# Patient Record
Sex: FEMALE | Race: WHITE | Employment: STUDENT | URBAN - METROPOLITAN AREA
[De-identification: names, ages, dates, MRNs, and addresses within clinical notes are randomized per-mention and may not be internally consistent; named-entity substitution may affect disease eponyms.]

---

## 2020-01-05 ENCOUNTER — APPOINTMENT (OUTPATIENT)
Dept: CT IMAGING | Age: 20
End: 2020-01-05
Attending: EMERGENCY MEDICINE
Payer: COMMERCIAL

## 2020-01-05 ENCOUNTER — HOSPITAL ENCOUNTER (EMERGENCY)
Age: 20
Discharge: HOME | End: 2020-01-06
Attending: EMERGENCY MEDICINE
Payer: COMMERCIAL

## 2020-01-05 DIAGNOSIS — N20.0 KIDNEY STONE: Primary | ICD-10-CM

## 2020-01-05 PROCEDURE — 74011250636 HC RX REV CODE- 250/636: Performed by: EMERGENCY MEDICINE

## 2020-01-05 PROCEDURE — 80053 COMPREHEN METABOLIC PANEL: CPT

## 2020-01-05 PROCEDURE — 96361 HYDRATE IV INFUSION ADD-ON: CPT | Performed by: EMERGENCY MEDICINE

## 2020-01-05 PROCEDURE — 74176 CT ABD & PELVIS W/O CONTRAST: CPT

## 2020-01-05 PROCEDURE — 83690 ASSAY OF LIPASE: CPT

## 2020-01-05 PROCEDURE — 99284 EMERGENCY DEPT VISIT MOD MDM: CPT | Performed by: EMERGENCY MEDICINE

## 2020-01-05 PROCEDURE — 96374 THER/PROPH/DIAG INJ IV PUSH: CPT | Performed by: EMERGENCY MEDICINE

## 2020-01-05 PROCEDURE — 85025 COMPLETE CBC W/AUTO DIFF WBC: CPT

## 2020-01-05 PROCEDURE — 96375 TX/PRO/DX INJ NEW DRUG ADDON: CPT | Performed by: EMERGENCY MEDICINE

## 2020-01-05 RX ORDER — KETOROLAC TROMETHAMINE 30 MG/ML
30 INJECTION, SOLUTION INTRAMUSCULAR; INTRAVENOUS
Status: COMPLETED | OUTPATIENT
Start: 2020-01-05 | End: 2020-01-05

## 2020-01-05 RX ORDER — MORPHINE SULFATE 2 MG/ML
2 INJECTION, SOLUTION INTRAMUSCULAR; INTRAVENOUS
Status: COMPLETED | OUTPATIENT
Start: 2020-01-05 | End: 2020-01-05

## 2020-01-05 RX ADMIN — SODIUM CHLORIDE 1000 ML: 900 INJECTION, SOLUTION INTRAVENOUS at 23:40

## 2020-01-05 RX ADMIN — MORPHINE SULFATE 2 MG: 2 INJECTION, SOLUTION INTRAMUSCULAR; INTRAVENOUS at 23:40

## 2020-01-05 RX ADMIN — KETOROLAC TROMETHAMINE 30 MG: 30 INJECTION, SOLUTION INTRAMUSCULAR at 23:40

## 2020-01-06 VITALS
TEMPERATURE: 98 F | DIASTOLIC BLOOD PRESSURE: 60 MMHG | SYSTOLIC BLOOD PRESSURE: 127 MMHG | OXYGEN SATURATION: 97 % | RESPIRATION RATE: 18 BRPM | HEART RATE: 108 BPM

## 2020-01-06 LAB
ALBUMIN SERPL-MCNC: 3.8 G/DL (ref 3.5–5)
ALBUMIN/GLOB SERPL: 1 {RATIO} (ref 1.2–3.5)
ALP SERPL-CCNC: 85 U/L (ref 50–136)
ALT SERPL-CCNC: 15 U/L (ref 12–65)
ANION GAP SERPL CALC-SCNC: 6 MMOL/L (ref 7–16)
APPEARANCE UR: CLEAR
AST SERPL-CCNC: 18 U/L (ref 15–37)
BACTERIA URNS QL MICRO: 0 /HPF
BASOPHILS # BLD: 0.1 K/UL (ref 0–0.2)
BASOPHILS NFR BLD: 1 % (ref 0–2)
BILIRUB SERPL-MCNC: 1.1 MG/DL (ref 0.2–1.1)
BILIRUB UR QL: NEGATIVE
BUN SERPL-MCNC: 11 MG/DL (ref 6–23)
CALCIUM SERPL-MCNC: 9 MG/DL (ref 8.3–10.4)
CASTS URNS QL MICRO: ABNORMAL /LPF
CHLORIDE SERPL-SCNC: 106 MMOL/L (ref 98–107)
CO2 SERPL-SCNC: 25 MMOL/L (ref 21–32)
COLOR UR: YELLOW
CREAT SERPL-MCNC: 0.88 MG/DL (ref 0.6–1)
DIFFERENTIAL METHOD BLD: ABNORMAL
EOSINOPHIL # BLD: 0.1 K/UL (ref 0–0.8)
EOSINOPHIL NFR BLD: 1 % (ref 0.5–7.8)
EPI CELLS #/AREA URNS HPF: ABNORMAL /HPF
ERYTHROCYTE [DISTWIDTH] IN BLOOD BY AUTOMATED COUNT: 11.8 % (ref 11.9–14.6)
GLOBULIN SER CALC-MCNC: 3.9 G/DL (ref 2.3–3.5)
GLUCOSE SERPL-MCNC: 97 MG/DL (ref 65–100)
GLUCOSE UR STRIP.AUTO-MCNC: NEGATIVE MG/DL
HCT VFR BLD AUTO: 40.1 % (ref 35.8–46.3)
HGB BLD-MCNC: 13.9 G/DL (ref 11.7–15.4)
HGB UR QL STRIP: ABNORMAL
IMM GRANULOCYTES # BLD AUTO: 0.1 K/UL (ref 0–0.5)
IMM GRANULOCYTES NFR BLD AUTO: 1 % (ref 0–5)
KETONES UR QL STRIP.AUTO: NEGATIVE MG/DL
LEUKOCYTE ESTERASE UR QL STRIP.AUTO: NEGATIVE
LIPASE SERPL-CCNC: 744 U/L (ref 73–393)
LYMPHOCYTES # BLD: 2.6 K/UL (ref 0.5–4.6)
LYMPHOCYTES NFR BLD: 32 % (ref 13–44)
MCH RBC QN AUTO: 31.7 PG (ref 26.1–32.9)
MCHC RBC AUTO-ENTMCNC: 34.7 G/DL (ref 31.4–35)
MCV RBC AUTO: 91.6 FL (ref 79.6–97.8)
MONOCYTES # BLD: 0.5 K/UL (ref 0.1–1.3)
MONOCYTES NFR BLD: 7 % (ref 4–12)
NEUTS SEG # BLD: 4.9 K/UL (ref 1.7–8.2)
NEUTS SEG NFR BLD: 59 % (ref 43–78)
NITRITE UR QL STRIP.AUTO: NEGATIVE
NRBC # BLD: 0 K/UL (ref 0–0.2)
OTHER OBSERVATIONS,UCOM: ABNORMAL
PH UR STRIP: 5.5 [PH] (ref 5–9)
PLATELET # BLD AUTO: 284 K/UL (ref 150–450)
PMV BLD AUTO: 10.5 FL (ref 9.4–12.3)
POTASSIUM SERPL-SCNC: 4 MMOL/L (ref 3.5–5.1)
PROT SERPL-MCNC: 7.7 G/DL (ref 6.3–8.2)
PROT UR STRIP-MCNC: 30 MG/DL
RBC # BLD AUTO: 4.38 M/UL (ref 4.05–5.2)
RBC #/AREA URNS HPF: ABNORMAL /HPF
SODIUM SERPL-SCNC: 137 MMOL/L (ref 136–145)
SP GR UR REFRACTOMETRY: >1.03 (ref 1–1.02)
UROBILINOGEN UR QL STRIP.AUTO: 0.2 EU/DL (ref 0.2–1)
WBC # BLD AUTO: 8.3 K/UL (ref 4.3–11.1)
WBC URNS QL MICRO: ABNORMAL /HPF

## 2020-01-06 PROCEDURE — 74011250636 HC RX REV CODE- 250/636: Performed by: EMERGENCY MEDICINE

## 2020-01-06 PROCEDURE — 96375 TX/PRO/DX INJ NEW DRUG ADDON: CPT | Performed by: EMERGENCY MEDICINE

## 2020-01-06 PROCEDURE — 81003 URINALYSIS AUTO W/O SCOPE: CPT

## 2020-01-06 PROCEDURE — 74011250637 HC RX REV CODE- 250/637: Performed by: EMERGENCY MEDICINE

## 2020-01-06 RX ORDER — TAMSULOSIN HYDROCHLORIDE 0.4 MG/1
0.4 CAPSULE ORAL DAILY
Qty: 10 CAP | Refills: 0 | Status: SHIPPED | OUTPATIENT
Start: 2020-01-06 | End: 2020-01-16

## 2020-01-06 RX ORDER — TAMSULOSIN HYDROCHLORIDE 0.4 MG/1
0.4 CAPSULE ORAL
Status: COMPLETED | OUTPATIENT
Start: 2020-01-06 | End: 2020-01-06

## 2020-01-06 RX ORDER — HYDROCODONE BITARTRATE AND ACETAMINOPHEN 7.5; 325 MG/1; MG/1
1 TABLET ORAL 2 TIMES DAILY
Qty: 17 TAB | Refills: 0 | Status: SHIPPED | OUTPATIENT
Start: 2020-01-06 | End: 2020-01-13

## 2020-01-06 RX ORDER — ONDANSETRON 2 MG/ML
4 INJECTION INTRAMUSCULAR; INTRAVENOUS
Status: COMPLETED | OUTPATIENT
Start: 2020-01-06 | End: 2020-01-06

## 2020-01-06 RX ORDER — ONDANSETRON 8 MG/1
8 TABLET, ORALLY DISINTEGRATING ORAL
Qty: 12 TAB | Refills: 0 | Status: SHIPPED | OUTPATIENT
Start: 2020-01-06 | End: 2020-01-29 | Stop reason: CLARIF

## 2020-01-06 RX ORDER — HYDROCODONE BITARTRATE AND ACETAMINOPHEN 5; 325 MG/1; MG/1
1 TABLET ORAL ONCE
Status: COMPLETED | OUTPATIENT
Start: 2020-01-06 | End: 2020-01-06

## 2020-01-06 RX ADMIN — ONDANSETRON 4 MG: 2 INJECTION INTRAMUSCULAR; INTRAVENOUS at 00:27

## 2020-01-06 RX ADMIN — TAMSULOSIN HYDROCHLORIDE 0.4 MG: 0.4 CAPSULE ORAL at 00:38

## 2020-01-06 RX ADMIN — HYDROCODONE BITARTRATE AND ACETAMINOPHEN 1 TABLET: 5; 325 TABLET ORAL at 00:38

## 2020-01-06 NOTE — ED PROVIDER NOTES
Patient presents to the ER complaint of left flank pain. Patient states symptoms started early this evening with pain in her left lower quadrant. States pain is since radiated towards her left flank. Reports some nausea vomiting. Denies any urinary symptoms. Reports normal bowel movements the past couple days. Denies any dysuria, hematuria, hematochezia or melena. Denies any vaginal bleeding or vaginal discharge    The history is provided by the patient. Flank Pain    This is a new problem. The current episode started 1 to 2 hours ago. The problem occurs constantly. The pain is present in the left side. The quality of the pain is described as aching. The pain is at a severity of 5/10. The pain is moderate. Pertinent negatives include no fever, no numbness, no abdominal pain, no abdominal swelling, no bladder incontinence, no dysuria, no paresthesias and no paresis. She has tried nothing for the symptoms. History reviewed. No pertinent past medical history. Past Surgical History:   Procedure Laterality Date    HX CHOLECYSTECTOMY           History reviewed. No pertinent family history.     Social History     Socioeconomic History    Marital status: SINGLE     Spouse name: Not on file    Number of children: Not on file    Years of education: Not on file    Highest education level: Not on file   Occupational History    Not on file   Social Needs    Financial resource strain: Not on file    Food insecurity:     Worry: Not on file     Inability: Not on file    Transportation needs:     Medical: Not on file     Non-medical: Not on file   Tobacco Use    Smoking status: Never Smoker    Smokeless tobacco: Never Used   Substance and Sexual Activity    Alcohol use: Never     Frequency: Never    Drug use: Never    Sexual activity: Not on file   Lifestyle    Physical activity:     Days per week: Not on file     Minutes per session: Not on file    Stress: Not on file   Relationships    Social connections:     Talks on phone: Not on file     Gets together: Not on file     Attends Scientologist service: Not on file     Active member of club or organization: Not on file     Attends meetings of clubs or organizations: Not on file     Relationship status: Not on file    Intimate partner violence:     Fear of current or ex partner: Not on file     Emotionally abused: Not on file     Physically abused: Not on file     Forced sexual activity: Not on file   Other Topics Concern    Not on file   Social History Narrative    Not on file         ALLERGIES: Patient has no known allergies. Review of Systems   Constitutional: Negative for fatigue, fever and unexpected weight change. HENT: Negative for congestion and dental problem. Eyes: Negative for photophobia, redness and visual disturbance. Respiratory: Negative for chest tightness. Cardiovascular: Negative for palpitations and leg swelling. Gastrointestinal: Positive for nausea and vomiting. Negative for abdominal pain. Endocrine: Negative for polydipsia, polyphagia and polyuria. Genitourinary: Positive for flank pain. Negative for bladder incontinence, dysuria and urgency. Musculoskeletal: Negative for back pain. Skin: Negative for color change and pallor. Neurological: Negative for light-headedness, numbness and paresthesias. Hematological: Negative for adenopathy. Does not bruise/bleed easily. All other systems reviewed and are negative. Vitals:    01/05/20 2316   BP: 122/69   Pulse: 88   Resp: 18   Temp: 98 °F (36.7 °C)   SpO2: 98%            Physical Exam  Vitals signs and nursing note reviewed. Constitutional:       Appearance: Normal appearance. HENT:      Head: Normocephalic and atraumatic. Neck:      Musculoskeletal: Normal range of motion and neck supple. No neck rigidity. Cardiovascular:      Rate and Rhythm: Normal rate and regular rhythm. Pulses: Normal pulses. Heart sounds: Normal heart sounds. Pulmonary:      Effort: Pulmonary effort is normal.      Breath sounds: Normal breath sounds. Abdominal:      General: Abdomen is flat. Bowel sounds are normal.      Palpations: Abdomen is soft. Musculoskeletal: Normal range of motion. General: No swelling. Skin:     General: Skin is warm. Capillary Refill: Capillary refill takes less than 2 seconds. Coloration: Skin is not jaundiced. Neurological:      General: No focal deficit present. Mental Status: She is alert. Mental status is at baseline. MDM  Number of Diagnoses or Management Options  Diagnosis management comments: Differential diagnosis includes kidney stone, pyelonephritis, gastritis, colitis, ovarian cyst    1:13 AM  Normal basic labs and normal chemistry function. Urinalysis shows no signs of infection  CT urogram shows a proximal 4 mm left ureteral stone    Symptomatically medically patient is improved, has been treated here with pain medicine. Given first dose of Flomax. Plan to discharge home and follow-up with urology.        Amount and/or Complexity of Data Reviewed  Clinical lab tests: ordered and reviewed  Tests in the radiology section of CPT®: ordered and reviewed    Risk of Complications, Morbidity, and/or Mortality  Presenting problems: moderate  Diagnostic procedures: moderate  Management options: moderate    Patient Progress  Patient progress: improved         Procedures               Results Include:    Recent Results (from the past 24 hour(s))   CBC WITH AUTOMATED DIFF    Collection Time: 01/05/20 11:37 PM   Result Value Ref Range    WBC 8.3 4.3 - 11.1 K/uL    RBC 4.38 4.05 - 5.2 M/uL    HGB 13.9 11.7 - 15.4 g/dL    HCT 40.1 35.8 - 46.3 %    MCV 91.6 79.6 - 97.8 FL    MCH 31.7 26.1 - 32.9 PG    MCHC 34.7 31.4 - 35.0 g/dL    RDW 11.8 (L) 11.9 - 14.6 %    PLATELET 703 504 - 643 K/uL    MPV 10.5 9.4 - 12.3 FL    ABSOLUTE NRBC 0.00 0.0 - 0.2 K/uL    DF AUTOMATED      NEUTROPHILS 59 43 - 78 % LYMPHOCYTES 32 13 - 44 %    MONOCYTES 7 4.0 - 12.0 %    EOSINOPHILS 1 0.5 - 7.8 %    BASOPHILS 1 0.0 - 2.0 %    IMMATURE GRANULOCYTES 1 0.0 - 5.0 %    ABS. NEUTROPHILS 4.9 1.7 - 8.2 K/UL    ABS. LYMPHOCYTES 2.6 0.5 - 4.6 K/UL    ABS. MONOCYTES 0.5 0.1 - 1.3 K/UL    ABS. EOSINOPHILS 0.1 0.0 - 0.8 K/UL    ABS. BASOPHILS 0.1 0.0 - 0.2 K/UL    ABS. IMM. GRANS. 0.1 0.0 - 0.5 K/UL   METABOLIC PANEL, COMPREHENSIVE    Collection Time: 01/05/20 11:37 PM   Result Value Ref Range    Sodium 137 136 - 145 mmol/L    Potassium 4.0 3.5 - 5.1 mmol/L    Chloride 106 98 - 107 mmol/L    CO2 25 21 - 32 mmol/L    Anion gap 6 (L) 7 - 16 mmol/L    Glucose 97 65 - 100 mg/dL    BUN 11 6 - 23 MG/DL    Creatinine 0.88 0.6 - 1.0 MG/DL    GFR est AA >60 >60 ml/min/1.73m2    GFR est non-AA >60 >60 ml/min/1.73m2    Calcium 9.0 8.3 - 10.4 MG/DL    Bilirubin, total 1.1 0.2 - 1.1 MG/DL    ALT (SGPT) 15 12 - 65 U/L    AST (SGOT) 18 15 - 37 U/L    Alk. phosphatase 85 50 - 136 U/L    Protein, total 7.7 6.3 - 8.2 g/dL    Albumin 3.8 3.5 - 5.0 g/dL    Globulin 3.9 (H) 2.3 - 3.5 g/dL    A-G Ratio 1.0 (L) 1.2 - 3.5     LIPASE    Collection Time: 01/05/20 11:37 PM   Result Value Ref Range    Lipase 744 (H) 73 - 393 U/L   URINALYSIS W/ RFLX MICROSCOPIC    Collection Time: 01/06/20 12:29 AM   Result Value Ref Range    Color YELLOW      Appearance CLEAR      Specific gravity >1.030 (H) 1.001 - 1.023    pH (UA) 5.5 5.0 - 9.0      Protein 30 (A) NEG mg/dL    Glucose NEGATIVE  NEG mg/dL    Ketone NEGATIVE  NEG mg/dL    Bilirubin NEGATIVE  NEG      Blood LARGE (A) NEG      Urobilinogen 0.2 0.2 - 1.0 EU/dL    Nitrites NEGATIVE  NEG      Leukocyte Esterase NEGATIVE  NEG      WBC 5-10 0 /hpf    RBC 3-5 0 /hpf    Epithelial cells 0-3 0 /hpf    Bacteria 0 0 /hpf    Casts 0-3 0 /lpf    Other observations RESULTS VERIFIED MANUALLY       Voice dictation software was used during the making of this note.   This software is not perfect and grammatical and other typographical errors may be present. This note has been proofread, but may still contain errors.   Lavern Randolph MD; 1/6/2020 @1:14 AM   ===================================================================

## 2020-01-06 NOTE — DISCHARGE INSTRUCTIONS
Take medications as prescribed  Drink plenty of fluids  Follow-up with urology  Return to the ER for any new or worsening symptoms    Kidney Stone: Care Instructions  Your Care Instructions    Kidney stones are formed when salts, minerals, and other substances normally found in the urine clump together. They can be as small as grains of sand or, rarely, as large as golf balls. While the stone is traveling through the ureter, which is the tube that carries urine from the kidney to the bladder, you will probably feel pain. The pain may be mild or very severe. You may also have some blood in your urine. As soon as the stone reaches the bladder, any intense pain should go away. If a stone is too large to pass on its own, you may need a medical procedure to help you pass the stone. The doctor has checked you carefully, but problems can develop later. If you notice any problems or new symptoms, get medical treatment right away. Follow-up care is a key part of your treatment and safety. Be sure to make and go to all appointments, and call your doctor if you are having problems. It's also a good idea to know your test results and keep a list of the medicines you take. How can you care for yourself at home? · Drink plenty of fluids, enough so that your urine is light yellow or clear like water. If you have kidney, heart, or liver disease and have to limit fluids, talk with your doctor before you increase the amount of fluids you drink. · Take pain medicines exactly as directed. Call your doctor if you think you are having a problem with your medicine. ? If the doctor gave you a prescription medicine for pain, take it as prescribed. ? If you are not taking a prescription pain medicine, ask your doctor if you can take an over-the-counter medicine. Read and follow all instructions on the label. · Your doctor may ask you to strain your urine so that you can collect your kidney stone when it passes.  You can use a kitchen strainer or a tea strainer to catch the stone. Store it in a plastic bag until you see your doctor again. Preventing future kidney stones  Some changes in your diet may help prevent kidney stones. Depending on the cause of your stones, your doctor may recommend that you:  · Drink plenty of fluids, enough so that your urine is light yellow or clear like water. If you have kidney, heart, or liver disease and have to limit fluids, talk with your doctor before you increase the amount of fluids you drink. · Limit coffee, tea, and alcohol. Also avoid grapefruit juice. · Do not take more than the recommended daily dose of vitamins C and D.  · Avoid antacids such as Gaviscon, Maalox, Mylanta, or Tums. · Limit the amount of salt (sodium) in your diet. · Eat a balanced diet that is not too high in protein. · Limit foods that are high in a substance called oxalate, which can cause kidney stones. These foods include dark green vegetables, rhubarb, chocolate, wheat bran, nuts, cranberries, and beans. When should you call for help? Call your doctor now or seek immediate medical care if:    · You cannot keep down fluids.     · Your pain gets worse.     · You have a fever or chills.     · You have new or worse pain in your back just below your rib cage (the flank area).     · You have new or more blood in your urine.    Watch closely for changes in your health, and be sure to contact your doctor if:    · You do not get better as expected. Where can you learn more? Go to http://martha-jose j.info/. Enter I910 in the search box to learn more about \"Kidney Stone: Care Instructions. \"  Current as of: October 31, 2018  Content Version: 12.2  © 0879-6869 [employer]. Care instructions adapted under license by [employer] (which disclaims liability or warranty for this information).  If you have questions about a medical condition or this instruction, always ask your healthcare professional. [address] any warranty or liability for your use of this information. Patient Education        Learning About Diet for Kidney Stone Prevention  What are kidney stones? Kidney stones are made of salts and minerals in the urine that form small \"michael. \" Stones can form in the kidneys and the ureters (the tubes that lead from the kidneys to the bladder). They can also form in the bladder. Stones may not cause a problem as long as they stay in the kidneys. But they can cause sudden, severe pain. Pain is most likely when the stones travel from the kidneys to the bladder. Kidney stones can cause bloody urine. Kidney stones often run in families. You are more likely to get them if you don't drink enough fluids, mainly water. Certain foods and drinks and some dietary supplements may also increase your risk for kidney stones if you consume too much of them. What can you do to prevent kidney stones? Changing what you eat may not prevent all types of kidney stones. But for people who have a history of certain kinds of kidney stones, some changes in diet may help. A dietitian can help you set up a meal plan that includes healthy, low-oxalate choices. Here are some general guidelines to get you started. Plan your meals and snacks around foods that are low in oxalate. These foods include:  · Corn, kale, parsnips, and squash,. · Beef, chicken, pork, turkey, and fish. · Milk, butter, cheese, and yogurt. You can eat certain foods that are medium-high in oxalate, but eat them only once in a while. These foods include:  · Bread. · Brown rice. · English muffins. · Figs. · Popcorn. · String beans. · Tomatoes. Limit very high-oxalate foods, including:  · Black tea. · Coffee. · Chocolate. · Dark green vegetables. · Nuts. Here are some other things you can do to help prevent kidney stones. · Drink plenty of fluids.  If you have kidney, heart, or liver disease and have to limit fluids, talk with your doctor before you increase the amount of fluids you drink. · Do not take more than the recommended daily dose of vitamins C and D.  · Limit the salt in your diet. · Eat a balanced diet that is not too high in protein. Follow-up care is a key part of your treatment and safety. Be sure to make and go to all appointments, and call your doctor if you are having problems. It's also a good idea to know your test results and keep a list of the medicines you take. Where can you learn more? Go to http://martha-jose j.info/. Enter C138 in the search box to learn more about \"Learning About Diet for Kidney Stone Prevention. \"  Current as of: November 7, 2018  Content Version: 12.2  © 7106-6823 [employer]. Care instructions adapted under license by [employer] (which disclaims liability or warranty for this information). If you have questions about a medical condition or this instruction, always ask your healthcare professional. [address] any warranty or liability for your use of this information.

## 2020-01-07 ENCOUNTER — HOSPITAL ENCOUNTER (INPATIENT)
Age: 20
LOS: 6 days | Discharge: HOME | DRG: 853 | End: 2020-01-13
Attending: UROLOGY | Admitting: UROLOGY
Payer: COMMERCIAL

## 2020-01-07 ENCOUNTER — ANESTHESIA (OUTPATIENT)
Dept: SURGERY | Age: 20
DRG: 853 | End: 2020-01-07
Payer: COMMERCIAL

## 2020-01-07 ENCOUNTER — ANESTHESIA EVENT (OUTPATIENT)
Dept: SURGERY | Age: 20
DRG: 853 | End: 2020-01-07
Payer: COMMERCIAL

## 2020-01-07 DIAGNOSIS — R09.02 HYPOXIA: ICD-10-CM

## 2020-01-07 DIAGNOSIS — A41.9 SEPSIS DUE TO URINARY TRACT INFECTION (HCC): ICD-10-CM

## 2020-01-07 DIAGNOSIS — N39.0 SEPSIS DUE TO URINARY TRACT INFECTION (HCC): ICD-10-CM

## 2020-01-07 DIAGNOSIS — N20.1 LEFT URETERAL STONE: ICD-10-CM

## 2020-01-07 DIAGNOSIS — J90 BILATERAL PLEURAL EFFUSION: ICD-10-CM

## 2020-01-07 LAB
ANION GAP SERPL CALC-SCNC: 11 MMOL/L (ref 7–16)
BUN SERPL-MCNC: 24 MG/DL (ref 6–23)
CALCIUM SERPL-MCNC: 8.8 MG/DL (ref 8.3–10.4)
CHLORIDE SERPL-SCNC: 104 MMOL/L (ref 98–107)
CO2 SERPL-SCNC: 22 MMOL/L (ref 21–32)
CREAT SERPL-MCNC: 2.28 MG/DL (ref 0.6–1)
ERYTHROCYTE [DISTWIDTH] IN BLOOD BY AUTOMATED COUNT: 12.4 % (ref 11.9–14.6)
GLUCOSE SERPL-MCNC: 91 MG/DL (ref 65–100)
HCG UR QL: NEGATIVE
HCT VFR BLD AUTO: 40.3 % (ref 35.8–46.3)
HGB BLD-MCNC: 13.4 G/DL (ref 11.7–15.4)
MCH RBC QN AUTO: 31.5 PG (ref 26.1–32.9)
MCHC RBC AUTO-ENTMCNC: 33.3 G/DL (ref 31.4–35)
MCV RBC AUTO: 94.8 FL (ref 79.6–97.8)
NRBC # BLD: 0 K/UL (ref 0–0.2)
PLATELET # BLD AUTO: 170 K/UL (ref 150–450)
PMV BLD AUTO: 11.8 FL (ref 9.4–12.3)
POTASSIUM SERPL-SCNC: 4.1 MMOL/L (ref 3.5–5.1)
RBC # BLD AUTO: 4.25 M/UL (ref 4.05–5.2)
SODIUM SERPL-SCNC: 137 MMOL/L (ref 136–145)
WBC # BLD AUTO: 23.2 K/UL (ref 4.3–11.1)

## 2020-01-07 PROCEDURE — 74011250636 HC RX REV CODE- 250/636: Performed by: NURSE ANESTHETIST, CERTIFIED REGISTERED

## 2020-01-07 PROCEDURE — 77030039425 HC BLD LARYNG TRULITE DISP TELE -A: Performed by: ANESTHESIOLOGY

## 2020-01-07 PROCEDURE — 74011250636 HC RX REV CODE- 250/636: Performed by: UROLOGY

## 2020-01-07 PROCEDURE — 0T778DZ DILATION OF LEFT URETER WITH INTRALUMINAL DEVICE, VIA NATURAL OR ARTIFICIAL OPENING ENDOSCOPIC: ICD-10-PCS | Performed by: UROLOGY

## 2020-01-07 PROCEDURE — 74011250636 HC RX REV CODE- 250/636: Performed by: NURSE PRACTITIONER

## 2020-01-07 PROCEDURE — 99218 HC RM OBSERVATION: CPT

## 2020-01-07 PROCEDURE — 36415 COLL VENOUS BLD VENIPUNCTURE: CPT

## 2020-01-07 PROCEDURE — C1769 GUIDE WIRE: HCPCS | Performed by: UROLOGY

## 2020-01-07 PROCEDURE — 77030034696 HC CATH URETH FOL 2W BARD -A: Performed by: UROLOGY

## 2020-01-07 PROCEDURE — 74011250637 HC RX REV CODE- 250/637: Performed by: UROLOGY

## 2020-01-07 PROCEDURE — 77030027138 HC INCENT SPIROMETER -A

## 2020-01-07 PROCEDURE — 87086 URINE CULTURE/COLONY COUNT: CPT

## 2020-01-07 PROCEDURE — 85027 COMPLETE CBC AUTOMATED: CPT

## 2020-01-07 PROCEDURE — C2617 STENT, NON-COR, TEM W/O DEL: HCPCS | Performed by: UROLOGY

## 2020-01-07 PROCEDURE — 74011000258 HC RX REV CODE- 258: Performed by: NURSE PRACTITIONER

## 2020-01-07 PROCEDURE — 76210000006 HC OR PH I REC 0.5 TO 1 HR: Performed by: UROLOGY

## 2020-01-07 PROCEDURE — 74011000250 HC RX REV CODE- 250: Performed by: NURSE ANESTHETIST, CERTIFIED REGISTERED

## 2020-01-07 PROCEDURE — 87040 BLOOD CULTURE FOR BACTERIA: CPT

## 2020-01-07 PROCEDURE — 81025 URINE PREGNANCY TEST: CPT

## 2020-01-07 PROCEDURE — 77030019927 HC TBNG IRR CYSTO BAXT -A: Performed by: UROLOGY

## 2020-01-07 PROCEDURE — 77030037088 HC TUBE ENDOTRACH ORAL NSL COVD-A: Performed by: ANESTHESIOLOGY

## 2020-01-07 PROCEDURE — 74011250636 HC RX REV CODE- 250/636: Performed by: ANESTHESIOLOGY

## 2020-01-07 PROCEDURE — 76060000032 HC ANESTHESIA 0.5 TO 1 HR: Performed by: UROLOGY

## 2020-01-07 PROCEDURE — 80048 BASIC METABOLIC PNL TOTAL CA: CPT

## 2020-01-07 PROCEDURE — 76010000160 HC OR TIME 0.5 TO 1 HR INTENSV-TIER 1: Performed by: UROLOGY

## 2020-01-07 DEVICE — URETERAL STENT
Type: IMPLANTABLE DEVICE | Site: URETER | Status: FUNCTIONAL
Brand: PERCUFLEX™ PLUS

## 2020-01-07 RX ORDER — NALOXONE HYDROCHLORIDE 0.4 MG/ML
0.1 INJECTION, SOLUTION INTRAMUSCULAR; INTRAVENOUS; SUBCUTANEOUS
Status: DISCONTINUED | OUTPATIENT
Start: 2020-01-07 | End: 2020-01-07 | Stop reason: HOSPADM

## 2020-01-07 RX ORDER — HYDROMORPHONE HYDROCHLORIDE 2 MG/ML
0.5 INJECTION, SOLUTION INTRAMUSCULAR; INTRAVENOUS; SUBCUTANEOUS
Status: DISCONTINUED | OUTPATIENT
Start: 2020-01-07 | End: 2020-01-07 | Stop reason: HOSPADM

## 2020-01-07 RX ORDER — DIPHENHYDRAMINE HYDROCHLORIDE 50 MG/ML
12.5 INJECTION, SOLUTION INTRAMUSCULAR; INTRAVENOUS
Status: DISCONTINUED | OUTPATIENT
Start: 2020-01-07 | End: 2020-01-07 | Stop reason: HOSPADM

## 2020-01-07 RX ORDER — SODIUM CHLORIDE 0.9 % (FLUSH) 0.9 %
5-40 SYRINGE (ML) INJECTION AS NEEDED
Status: DISCONTINUED | OUTPATIENT
Start: 2020-01-07 | End: 2020-01-13 | Stop reason: HOSPADM

## 2020-01-07 RX ORDER — SODIUM CHLORIDE, SODIUM LACTATE, POTASSIUM CHLORIDE, CALCIUM CHLORIDE 600; 310; 30; 20 MG/100ML; MG/100ML; MG/100ML; MG/100ML
100 INJECTION, SOLUTION INTRAVENOUS CONTINUOUS
Status: DISCONTINUED | OUTPATIENT
Start: 2020-01-07 | End: 2020-01-07 | Stop reason: HOSPADM

## 2020-01-07 RX ORDER — SODIUM CHLORIDE 0.9 % (FLUSH) 0.9 %
5-40 SYRINGE (ML) INJECTION EVERY 8 HOURS
Status: DISCONTINUED | OUTPATIENT
Start: 2020-01-07 | End: 2020-01-07

## 2020-01-07 RX ORDER — LIDOCAINE HYDROCHLORIDE 10 MG/ML
0.1 INJECTION INFILTRATION; PERINEURAL AS NEEDED
Status: DISCONTINUED | OUTPATIENT
Start: 2020-01-07 | End: 2020-01-07 | Stop reason: HOSPADM

## 2020-01-07 RX ORDER — AMOXICILLIN 250 MG
1 CAPSULE ORAL
Status: DISCONTINUED | OUTPATIENT
Start: 2020-01-07 | End: 2020-01-13 | Stop reason: HOSPADM

## 2020-01-07 RX ORDER — CEFTRIAXONE 1 G/1
1 INJECTION, POWDER, FOR SOLUTION INTRAMUSCULAR; INTRAVENOUS EVERY 24 HOURS
Status: DISCONTINUED | OUTPATIENT
Start: 2020-01-07 | End: 2020-01-07 | Stop reason: SDUPTHER

## 2020-01-07 RX ORDER — PROPOFOL 10 MG/ML
INJECTION, EMULSION INTRAVENOUS AS NEEDED
Status: DISCONTINUED | OUTPATIENT
Start: 2020-01-07 | End: 2020-01-07 | Stop reason: HOSPADM

## 2020-01-07 RX ORDER — SODIUM CHLORIDE, SODIUM LACTATE, POTASSIUM CHLORIDE, CALCIUM CHLORIDE 600; 310; 30; 20 MG/100ML; MG/100ML; MG/100ML; MG/100ML
75 INJECTION, SOLUTION INTRAVENOUS CONTINUOUS
Status: DISCONTINUED | OUTPATIENT
Start: 2020-01-07 | End: 2020-01-07 | Stop reason: HOSPADM

## 2020-01-07 RX ORDER — HYDROCODONE BITARTRATE AND ACETAMINOPHEN 5; 325 MG/1; MG/1
1 TABLET ORAL
Status: DISCONTINUED | OUTPATIENT
Start: 2020-01-07 | End: 2020-01-07

## 2020-01-07 RX ORDER — SODIUM CHLORIDE 9 MG/ML
75 INJECTION, SOLUTION INTRAVENOUS CONTINUOUS
Status: DISCONTINUED | OUTPATIENT
Start: 2020-01-07 | End: 2020-01-09

## 2020-01-07 RX ORDER — HYDROMORPHONE HYDROCHLORIDE 1 MG/ML
1 INJECTION, SOLUTION INTRAMUSCULAR; INTRAVENOUS; SUBCUTANEOUS
Status: DISCONTINUED | OUTPATIENT
Start: 2020-01-07 | End: 2020-01-07

## 2020-01-07 RX ORDER — HYOSCYAMINE SULFATE 0.12 MG/1
0.12 TABLET SUBLINGUAL
Status: DISCONTINUED | OUTPATIENT
Start: 2020-01-07 | End: 2020-01-13 | Stop reason: HOSPADM

## 2020-01-07 RX ORDER — ACETAMINOPHEN 325 MG/1
650 TABLET ORAL
Status: DISCONTINUED | OUTPATIENT
Start: 2020-01-07 | End: 2020-01-07

## 2020-01-07 RX ORDER — SODIUM CHLORIDE 0.9 % (FLUSH) 0.9 %
5-40 SYRINGE (ML) INJECTION AS NEEDED
Status: DISCONTINUED | OUTPATIENT
Start: 2020-01-07 | End: 2020-01-07

## 2020-01-07 RX ORDER — FLUMAZENIL 0.1 MG/ML
0.2 INJECTION INTRAVENOUS
Status: DISCONTINUED | OUTPATIENT
Start: 2020-01-07 | End: 2020-01-07 | Stop reason: HOSPADM

## 2020-01-07 RX ORDER — LIDOCAINE HYDROCHLORIDE 20 MG/ML
INJECTION, SOLUTION EPIDURAL; INFILTRATION; INTRACAUDAL; PERINEURAL AS NEEDED
Status: DISCONTINUED | OUTPATIENT
Start: 2020-01-07 | End: 2020-01-07 | Stop reason: HOSPADM

## 2020-01-07 RX ORDER — SODIUM CHLORIDE 0.9 % (FLUSH) 0.9 %
5-40 SYRINGE (ML) INJECTION EVERY 8 HOURS
Status: DISCONTINUED | OUTPATIENT
Start: 2020-01-07 | End: 2020-01-13 | Stop reason: HOSPADM

## 2020-01-07 RX ORDER — HYDROCODONE BITARTRATE AND ACETAMINOPHEN 7.5; 325 MG/1; MG/1
1 TABLET ORAL 2 TIMES DAILY
Status: DISCONTINUED | OUTPATIENT
Start: 2020-01-07 | End: 2020-01-07

## 2020-01-07 RX ORDER — DEXTROSE MONOHYDRATE AND SODIUM CHLORIDE 5; .45 G/100ML; G/100ML
150 INJECTION, SOLUTION INTRAVENOUS CONTINUOUS
Status: DISCONTINUED | OUTPATIENT
Start: 2020-01-07 | End: 2020-01-07

## 2020-01-07 RX ORDER — KETOROLAC TROMETHAMINE 15 MG/ML
15 INJECTION, SOLUTION INTRAMUSCULAR; INTRAVENOUS
Status: DISCONTINUED | OUTPATIENT
Start: 2020-01-07 | End: 2020-01-08

## 2020-01-07 RX ORDER — ONDANSETRON 2 MG/ML
4 INJECTION INTRAMUSCULAR; INTRAVENOUS
Status: DISCONTINUED | OUTPATIENT
Start: 2020-01-07 | End: 2020-01-07

## 2020-01-07 RX ORDER — MIDAZOLAM HYDROCHLORIDE 1 MG/ML
2 INJECTION, SOLUTION INTRAMUSCULAR; INTRAVENOUS
Status: COMPLETED | OUTPATIENT
Start: 2020-01-07 | End: 2020-01-07

## 2020-01-07 RX ORDER — SUCCINYLCHOLINE CHLORIDE 20 MG/ML
INJECTION INTRAMUSCULAR; INTRAVENOUS AS NEEDED
Status: DISCONTINUED | OUTPATIENT
Start: 2020-01-07 | End: 2020-01-07 | Stop reason: HOSPADM

## 2020-01-07 RX ORDER — FENTANYL CITRATE 50 UG/ML
INJECTION, SOLUTION INTRAMUSCULAR; INTRAVENOUS AS NEEDED
Status: DISCONTINUED | OUTPATIENT
Start: 2020-01-07 | End: 2020-01-07 | Stop reason: HOSPADM

## 2020-01-07 RX ORDER — TAMSULOSIN HYDROCHLORIDE 0.4 MG/1
0.4 CAPSULE ORAL DAILY
Status: DISCONTINUED | OUTPATIENT
Start: 2020-01-08 | End: 2020-01-13 | Stop reason: HOSPADM

## 2020-01-07 RX ORDER — NALOXONE HYDROCHLORIDE 0.4 MG/ML
0.4 INJECTION, SOLUTION INTRAMUSCULAR; INTRAVENOUS; SUBCUTANEOUS AS NEEDED
Status: DISCONTINUED | OUTPATIENT
Start: 2020-01-07 | End: 2020-01-13 | Stop reason: HOSPADM

## 2020-01-07 RX ORDER — OXYCODONE HYDROCHLORIDE 5 MG/1
5 TABLET ORAL
Status: DISCONTINUED | OUTPATIENT
Start: 2020-01-07 | End: 2020-01-07 | Stop reason: HOSPADM

## 2020-01-07 RX ORDER — ROCURONIUM BROMIDE 10 MG/ML
INJECTION, SOLUTION INTRAVENOUS AS NEEDED
Status: DISCONTINUED | OUTPATIENT
Start: 2020-01-07 | End: 2020-01-07 | Stop reason: HOSPADM

## 2020-01-07 RX ORDER — ONDANSETRON 2 MG/ML
INJECTION INTRAMUSCULAR; INTRAVENOUS AS NEEDED
Status: DISCONTINUED | OUTPATIENT
Start: 2020-01-07 | End: 2020-01-07 | Stop reason: HOSPADM

## 2020-01-07 RX ORDER — DEXAMETHASONE SODIUM PHOSPHATE 4 MG/ML
INJECTION, SOLUTION INTRA-ARTICULAR; INTRALESIONAL; INTRAMUSCULAR; INTRAVENOUS; SOFT TISSUE AS NEEDED
Status: DISCONTINUED | OUTPATIENT
Start: 2020-01-07 | End: 2020-01-07 | Stop reason: HOSPADM

## 2020-01-07 RX ORDER — ACETAMINOPHEN 325 MG/1
650 TABLET ORAL
Status: DISCONTINUED | OUTPATIENT
Start: 2020-01-07 | End: 2020-01-08

## 2020-01-07 RX ORDER — MORPHINE SULFATE 2 MG/ML
2 INJECTION, SOLUTION INTRAMUSCULAR; INTRAVENOUS
Status: DISCONTINUED | OUTPATIENT
Start: 2020-01-07 | End: 2020-01-08

## 2020-01-07 RX ORDER — ONDANSETRON 2 MG/ML
4 INJECTION INTRAMUSCULAR; INTRAVENOUS
Status: DISCONTINUED | OUTPATIENT
Start: 2020-01-07 | End: 2020-01-13 | Stop reason: HOSPADM

## 2020-01-07 RX ADMIN — Medication 10 ML: at 16:05

## 2020-01-07 RX ADMIN — MIDAZOLAM 2 MG: 1 INJECTION INTRAMUSCULAR; INTRAVENOUS at 12:38

## 2020-01-07 RX ADMIN — PHENYLEPHRINE HYDROCHLORIDE 120 MCG: 10 INJECTION INTRAVENOUS at 12:58

## 2020-01-07 RX ADMIN — SODIUM CHLORIDE 150 ML/HR: 900 INJECTION, SOLUTION INTRAVENOUS at 15:05

## 2020-01-07 RX ADMIN — PHENYLEPHRINE HYDROCHLORIDE 120 MCG: 10 INJECTION INTRAVENOUS at 13:10

## 2020-01-07 RX ADMIN — DEXAMETHASONE SODIUM PHOSPHATE 4 MG: 4 INJECTION, SOLUTION INTRAMUSCULAR; INTRAVENOUS at 13:03

## 2020-01-07 RX ADMIN — PHENYLEPHRINE HYDROCHLORIDE 120 MCG: 10 INJECTION INTRAVENOUS at 13:03

## 2020-01-07 RX ADMIN — SUCCINYLCHOLINE CHLORIDE 100 MG: 20 INJECTION, SOLUTION INTRAMUSCULAR; INTRAVENOUS at 12:58

## 2020-01-07 RX ADMIN — ROCURONIUM BROMIDE 20 MG: 10 INJECTION, SOLUTION INTRAVENOUS at 13:05

## 2020-01-07 RX ADMIN — ROCURONIUM BROMIDE 5 MG: 10 INJECTION, SOLUTION INTRAVENOUS at 12:57

## 2020-01-07 RX ADMIN — PROPOFOL 50 MG: 10 INJECTION, EMULSION INTRAVENOUS at 12:58

## 2020-01-07 RX ADMIN — SODIUM CHLORIDE 150 ML/HR: 900 INJECTION, SOLUTION INTRAVENOUS at 23:35

## 2020-01-07 RX ADMIN — MORPHINE SULFATE 2 MG: 2 INJECTION, SOLUTION INTRAMUSCULAR; INTRAVENOUS at 18:31

## 2020-01-07 RX ADMIN — PROPOFOL 100 MG: 10 INJECTION, EMULSION INTRAVENOUS at 12:57

## 2020-01-07 RX ADMIN — MORPHINE SULFATE 2 MG: 2 INJECTION, SOLUTION INTRAMUSCULAR; INTRAVENOUS at 15:04

## 2020-01-07 RX ADMIN — ONDANSETRON 4 MG: 2 INJECTION INTRAMUSCULAR; INTRAVENOUS at 13:03

## 2020-01-07 RX ADMIN — CEFTRIAXONE 1 G: 1 INJECTION, POWDER, FOR SOLUTION INTRAMUSCULAR; INTRAVENOUS at 12:59

## 2020-01-07 RX ADMIN — FENTANYL CITRATE 75 MCG: 50 INJECTION INTRAMUSCULAR; INTRAVENOUS at 12:55

## 2020-01-07 RX ADMIN — SODIUM CHLORIDE, SODIUM LACTATE, POTASSIUM CHLORIDE, AND CALCIUM CHLORIDE 1000 ML: 600; 310; 30; 20 INJECTION, SOLUTION INTRAVENOUS at 12:35

## 2020-01-07 RX ADMIN — ACETAMINOPHEN 650 MG: 325 TABLET, FILM COATED ORAL at 20:50

## 2020-01-07 RX ADMIN — SUGAMMADEX 200 MG: 100 INJECTION, SOLUTION INTRAVENOUS at 13:18

## 2020-01-07 RX ADMIN — PHENYLEPHRINE HYDROCHLORIDE 120 MCG: 10 INJECTION INTRAVENOUS at 12:59

## 2020-01-07 RX ADMIN — MORPHINE SULFATE 2 MG: 2 INJECTION, SOLUTION INTRAMUSCULAR; INTRAVENOUS at 22:11

## 2020-01-07 RX ADMIN — LIDOCAINE HYDROCHLORIDE 60 MG: 20 INJECTION, SOLUTION EPIDURAL; INFILTRATION; INTRACAUDAL; PERINEURAL at 12:55

## 2020-01-07 NOTE — BRIEF OP NOTE
BRIEF OPERATIVE NOTE    Date of Procedure: 1/7/2020   Preoperative Diagnosis: Left ureteral stone [N20.1]  Postoperative Diagnosis: Left ureteral stone [N20.1]    Procedure(s):  CYSTOSCOPY Left URETERAL STENT INSERTION  Surgeon(s) and Role:     Maura Rodriguez MD - Primary         Surgical Assistant: None    Surgical Staff:  Circ-1: Katlin Payton  Circ-2: Adelaide Guardado RN  Event Time In Time Out   Incision Start 1302    Incision Close 1316      Anesthesia: General   Estimated Blood Loss: 1 cc   Specimens:   ID Type Source Tests Collected by Time Destination   1 : urine Urine Cystoscopic Urine CULTURE, URINE Marta Lee MD 1/7/2020 1310 Microbiology      Findings: L proximal ureteral calcification seen on KUB in OR. Unremarkable L stent placement     Complications: None    Implants:   Implant Name Type Inv.  Item Serial No.  Lot No. LRB No. Used Action   STENT URET 6F 24CM - [id number]  STENT URET 6F 24CM  [facility] [facility] [id number] Left 1 Implanted

## 2020-01-07 NOTE — PERIOP NOTES
TRANSFER - OUT REPORT:    Verbal report given to Patti CLINE on Claudine Dale  being transferred to [address] [place] [address] for routine post - op       Report consisted of patients Situation, Background, Assessment and   Recommendations(SBAR). Information from the following report(s) SBAR, OR Summary, Procedure Summary, Intake/Output and MAR was reviewed with the receiving nurse. Lines:   Peripheral IV 01/07/20 Left Wrist (Active)   Site Assessment Clean, dry, & intact 1/7/2020  2:05 PM   Phlebitis Assessment 0 1/7/2020  2:05 PM   Infiltration Assessment 0 1/7/2020  2:05 PM   Dressing Status Clean, dry, & intact; Occlusive 1/7/2020  2:05 PM   Dressing Type Transparent;Tape 1/7/2020  2:05 PM   Hub Color/Line Status Pink;Patent 1/7/2020  2:05 PM   Alcohol Cap Used No 1/7/2020  2:05 PM        Opportunity for questions and clarification was provided. Patient transported with:   O2 @ 2 liters  Tech    VTE prophylaxis orders have been written for Claudine Dale. Patient and family given floor number and nurses name. Family updated re: pt status after security code verified. You received a medication, called Sugammadex, in the OR; this medication has been known to interfere with oral contraceptives (birth control pills). Please use a back up birth control method for a minimum of 7 days.

## 2020-01-07 NOTE — PROGRESS NOTES
01/07/20 1431   Dual Skin Pressure Injury Assessment   Dual Skin Pressure Injury Assessment WDL   Second Care Provider (Based on [address] [place] [address]) SON Sparks   no noted skin breakdown on the sacrum or heels. Skin is intact.

## 2020-01-07 NOTE — ANESTHESIA PREPROCEDURE EVALUATION
Relevant Problems   No relevant active problems       Anesthetic History   No history of anesthetic complications            Review of Systems / Medical History  Patient summary reviewed and pertinent labs reviewed    Pulmonary  Within defined limits                 Neuro/Psych   Within defined limits           Cardiovascular  Within defined limits                Exercise tolerance: >4 METS  Comments: Denies recent CP, SOB or Palpitations   GI/Hepatic/Renal               Comments: Urosepsis from kidney stone Endo/Other  Within defined limits           Other Findings              Physical Exam    Airway  Mallampati: II  TM Distance: 4 - 6 cm  Neck ROM: normal range of motion   Mouth opening: Normal     Cardiovascular      Rate: abnormal        Comments: tachycardic Dental  No notable dental hx       Pulmonary  Breath sounds clear to auscultation               Abdominal  GI exam deferred       Other Findings            Anesthetic Plan    ASA: 2, emergent  Anesthesia type: general          Induction: Intravenous and RSI  Anesthetic plan and risks discussed with: Patient      Preop IV fluid bolus. Pt with urosepsis from kidney stone.

## 2020-01-07 NOTE — H&P
H&P Update:  Baltazar Rivera was seen and examined. History and physical has been reviewed. Significant clinical changes have occurred as noted:      Visit Vitals  Pulse (!) 154   Temp 99.2 °F (37.3 °C)   Resp 20        GENERAL: No acute distress, Awake, Alert, Oriented X 3  CARDIAC: regular rate and rhythm  CHEST AND LUNG: Easy work of breathing  ABDOMEN: soft, non tender, non-distended  SKIN: No rash, no erythema, no lacerations or abrasions, no ecchymosis  NEUROLOGIC: cranial nerves 2-12 grossly intact     To OR for cystoscopy, left ureteral stent placement today. Sorin Reilly M.D. [facility]  [address], [address] [facility]  Phone: [phone]  Fax: [phone]        [facility]  : 2000          Chief Complaint   Patient presents with    New Patient       kidneyu stone            HPI      Baltazar Rivera is a 20 y.o. female presenting here as a new pt and seen at [facility] ER two days ago () for c/o left flank pain that started that same day along with nausea/vomiting. No hx of kidney stones. Found to have a 4 mm left proximal ureteral stone with left hydronephrosis. At the time, her urine was negative for infection. She was sent home with flomax/pain medication and scheduled f/u here. Her mother called today and said the patient was continuing to vomit and with temp of 99.9, we made appt for today. Today, on arrival, she is ill appearing with chills. Temp of 101.8. , SBP 90s. KUB shows the proximal ureteral stone. Urine under scope appears infected.        No past medical history on file. Past Surgical History:   Procedure Laterality Date    HX CHOLECYSTECTOMY                 Current Outpatient Medications   Medication Sig Dispense Refill    tamsulosin (FLOMAX) 0.4 mg capsule Take 1 Cap by mouth daily for 10 days.  10 Cap 0    HYDROcodone-acetaminophen (NORCO) 7.5-325 mg per tablet Take 1 Tab by mouth two (2) times a day for 30 days. Max Daily Amount: 2 Tabs. 17 Tab 0    ondansetron (ZOFRAN ODT) 8 mg disintegrating tablet Take 1 Tab by mouth every eight (8) hours as needed for Nausea. 12 Tab 0      No Known Allergies  Social History            Socioeconomic History    Marital status: SINGLE       Spouse name: Not on file    Number of children: Not on file    Years of education: Not on file    Highest education level: Not on file   Occupational History    Not on file   Social Needs    Financial resource strain: Not on file    Food insecurity:       Worry: Not on file       Inability: Not on file    Transportation needs:       Medical: Not on file       Non-medical: Not on file   Tobacco Use    Smoking status: Never Smoker    Smokeless tobacco: Never Used   Substance and Sexual Activity    Alcohol use: Never       Frequency: Never    Drug use: Never    Sexual activity: Not on file   Lifestyle    Physical activity:       Days per week: Not on file       Minutes per session: Not on file    Stress: Not on file   Relationships    Social connections:       Talks on phone: Not on file       Gets together: Not on file       Attends Hoahaoism service: Not on file       Active member of club or organization: Not on file       Attends meetings of clubs or organizations: Not on file       Relationship status: Not on file    Intimate partner violence:       Fear of current or ex partner: Not on file       Emotionally abused: Not on file       Physically abused: Not on file       Forced sexual activity: Not on file   Other Topics Concern    Not on file   Social History Narrative    Not on file      No family history on file.     Review of Systems  Constitutional: Positive for fever, chills, appetite change, malaise/fatigue and headaches. Negative for weight loss. Skin: Negative skin ROSPositive for itching. Eyes: Eyes negative  ENT: Positive for dry mouth.   Respiratory: Negative for cough, shortness of breath and wheezing. Cardiovascular:  Negative for chest pain, hypertension and varicose veins. GI: Positive for nausea, vomiting, abdominal pain, indigestion and heartburn. Genitourinary: Genitourinary negativePositive for urinary burning. Negative for menstrual problem, endometriosis, vaginal pain and hysterectomy. Number of pregnancies: 0. Number of births: 0.  Musculoskeletal:  Negative for back pain, bone pain, arthralgias and neck pain. Neurological:  Negative for dizziness, numbness and tremors. Psychological:  Negative for depression. Endocrine: Positive for cold intolerance, thirst and fatigue.  Negative for heat intolerance.     PHYSICAL EXAM     Visit Vitals  BP 97/49   Pulse (!) 155   Temp (!) 101.8 °F (38.8 °C) (Oral)   Ht 5' (1.524 m)   Wt 104 lb (47.2 kg)   BMI 20.31 kg/m²         General appearance - ill-appearing, mild distress, febrile  Mental status - alert, oriented to person, place, and time  Neck - supple  Chest/Lung-  Quiet, even and easy respiratory effort without use of accessory muscles  Abdomen - soft, nontender  Back exam - left flank pain  Neurological - normal speech, no focal findings or movement disorder noted on gross visual exam  Musculoskeletal - normal gait and station  Skin - normal coloration and turgor, no rashes        Urinalysis  UA - Dipstick         Results for orders placed or performed in visit on 01/07/20   AMB POC URINALYSIS DIP STICK AUTO W/ MICRO (PGU)     Status: None   Result Value Ref Range Status     Glucose (UA POC) Negative Negative mg/dL Final     Bilirubin (UA POC) Small Negative Final     Ketones (UA POC) Negative Negative Final     Specific gravity (UA POC) 1.030 1.001 - 1.035 Final     Blood (UA POC) Small Negative Final     pH (UA POC) 5.5 4.6 - 8.0 Final     Protein (UA POC) 300  Negative Final     Urobilinogen (POC) 0.2 mg/dL   Final     Nitrites (UA POC) Negative Negative Final     Leukocyte esterase (UA POC) Negative Negative Final         UA - Micro  WBC - 10-20  RBC - 10-20  Bacteria - 2+  Epith - 0           Assessment and Plan      ICD-10-CM ICD-9-CM     1. Kidney stone N20.0 592.0 AMB POC URINALYSIS DIP STICK AUTO W/ MICRO (PGU)         AMB POC XRAY ABDOMEN 1 VIEW      Pt appears septic with known obstructing left ureteral stone. She will need urgent admission to [facility] for IV hydration, urine/blood cultures, IV abx and for urgent cystoscopy and left ureteral stent insertion. Per Dr. Asael Wilson, she will go straight to pre-op holding to start IVF, cultures, abx and will be added on surgery schedule asap. She will then be admitted after procedure.   Will place orders.        Dr. Asael Wilson is supervising physician today and he approves plan of care.     Marixa Davenport NP

## 2020-01-07 NOTE — ANESTHESIA POSTPROCEDURE EVALUATION
Procedure(s):  CYSTOSCOPY Left URETERAL STENT INSERTION.     general    Anesthesia Post Evaluation      Multimodal analgesia: multimodal analgesia used between 6 hours prior to anesthesia start to PACU discharge  Patient location during evaluation: PACU  Patient participation: complete - patient participated  Level of consciousness: awake  Pain management: adequate  Airway patency: patent  Anesthetic complications: no  Cardiovascular status: acceptable  Respiratory status: spontaneous ventilation and acceptable  Hydration status: acceptable  Post anesthesia nausea and vomiting:  none      Vitals Value Taken Time   BP 97/55 1/7/2020  2:25 PM   Temp 37.6 °C (99.7 °F) 1/7/2020  2:05 PM   Pulse 126 1/7/2020  2:25 PM   Resp 16 1/7/2020  2:25 PM   SpO2 99 % 1/7/2020  2:25 PM

## 2020-01-07 NOTE — PROGRESS NOTES
Care Management Interventions  Transition of Care Consult (CM Consult): Discharge Planning  Discharge Durable Medical Equipment: No  Physical Therapy Consult: No  Occupational Therapy Consult: No  Speech Therapy Consult: No  Discharge Location  Discharge Placement: Home     Chart screened by  for discharge planning. Patient will likely discharge back home with family. No CM needs have been identified at this time. CM will continue to follow patient during hospitalization for discharge planning and needs. Please consult  if any new issues arise.

## 2020-01-07 NOTE — PERIOP NOTES
Per patient - urine collected for culture at office prior to arrival. Dr. Vanita pleitez MD stated to cancel order.

## 2020-01-08 ENCOUNTER — APPOINTMENT (OUTPATIENT)
Dept: CT IMAGING | Age: 20
DRG: 853 | End: 2020-01-08
Attending: INTERNAL MEDICINE
Payer: COMMERCIAL

## 2020-01-08 ENCOUNTER — APPOINTMENT (OUTPATIENT)
Dept: GENERAL RADIOLOGY | Age: 20
DRG: 853 | End: 2020-01-08
Attending: NURSE PRACTITIONER
Payer: COMMERCIAL

## 2020-01-08 PROBLEM — A41.9 SEPSIS (HCC): Status: ACTIVE | Noted: 2020-01-08

## 2020-01-08 LAB
ALBUMIN SERPL-MCNC: 2.1 G/DL (ref 3.5–5)
ALBUMIN/GLOB SERPL: 0.6 {RATIO} (ref 1.2–3.5)
ALP SERPL-CCNC: 82 U/L (ref 50–136)
ALT SERPL-CCNC: 17 U/L (ref 12–65)
AMORPH CRY URNS QL MICRO: ABNORMAL
ANION GAP SERPL CALC-SCNC: 7 MMOL/L (ref 7–16)
APPEARANCE UR: ABNORMAL
AST SERPL-CCNC: 17 U/L (ref 15–37)
ATRIAL RATE: 146 BPM
BACTERIA URNS QL MICRO: ABNORMAL /HPF
BASOPHILS # BLD: 0 K/UL (ref 0–0.2)
BASOPHILS NFR BLD: 0 % (ref 0–2)
BILIRUB DIRECT SERPL-MCNC: 0.3 MG/DL
BILIRUB SERPL-MCNC: 0.8 MG/DL (ref 0.2–1.1)
BILIRUB UR QL: NEGATIVE
BUN SERPL-MCNC: 18 MG/DL (ref 6–23)
CALCIUM SERPL-MCNC: 8 MG/DL (ref 8.3–10.4)
CALCULATED P AXIS, ECG09: 47 DEGREES
CALCULATED R AXIS, ECG10: 62 DEGREES
CALCULATED T AXIS, ECG11: 7 DEGREES
CHLORIDE SERPL-SCNC: 112 MMOL/L (ref 98–107)
CO2 SERPL-SCNC: 23 MMOL/L (ref 21–32)
COLOR UR: ABNORMAL
CREAT SERPL-MCNC: 1.47 MG/DL (ref 0.6–1)
DIAGNOSIS, 93000: NORMAL
DIFFERENTIAL METHOD BLD: ABNORMAL
EOSINOPHIL # BLD: 0 K/UL (ref 0–0.8)
EOSINOPHIL NFR BLD: 0 % (ref 0.5–7.8)
EPI CELLS #/AREA URNS HPF: ABNORMAL /HPF
ERYTHROCYTE [DISTWIDTH] IN BLOOD BY AUTOMATED COUNT: 12.6 % (ref 11.9–14.6)
ERYTHROCYTE [DISTWIDTH] IN BLOOD BY AUTOMATED COUNT: 12.6 % (ref 11.9–14.6)
FLUAV AG NPH QL IA: NEGATIVE
FLUBV AG NPH QL IA: NEGATIVE
GLOBULIN SER CALC-MCNC: 3.3 G/DL (ref 2.3–3.5)
GLUCOSE SERPL-MCNC: 135 MG/DL (ref 65–100)
GLUCOSE UR STRIP.AUTO-MCNC: NEGATIVE MG/DL
HCT VFR BLD AUTO: 30.6 % (ref 35.8–46.3)
HCT VFR BLD AUTO: 32.2 % (ref 35.8–46.3)
HGB BLD-MCNC: 10.1 G/DL (ref 11.7–15.4)
HGB BLD-MCNC: 10.8 G/DL (ref 11.7–15.4)
HGB UR QL STRIP: ABNORMAL
IMM GRANULOCYTES # BLD AUTO: 0.2 K/UL (ref 0–0.5)
IMM GRANULOCYTES NFR BLD AUTO: 2 % (ref 0–5)
KETONES UR QL STRIP.AUTO: NEGATIVE MG/DL
LACTATE SERPL-SCNC: 1.8 MMOL/L (ref 0.4–2)
LEUKOCYTE ESTERASE UR QL STRIP.AUTO: ABNORMAL
LYMPHOCYTES # BLD: 1.1 K/UL (ref 0.5–4.6)
LYMPHOCYTES NFR BLD: 12 % (ref 13–44)
MAGNESIUM SERPL-MCNC: 1.7 MG/DL (ref 1.8–2.4)
MCH RBC QN AUTO: 31.6 PG (ref 26.1–32.9)
MCH RBC QN AUTO: 31.9 PG (ref 26.1–32.9)
MCHC RBC AUTO-ENTMCNC: 33 G/DL (ref 31.4–35)
MCHC RBC AUTO-ENTMCNC: 33.5 G/DL (ref 31.4–35)
MCV RBC AUTO: 95 FL (ref 79.6–97.8)
MCV RBC AUTO: 95.6 FL (ref 79.6–97.8)
MONOCYTES # BLD: 0.3 K/UL (ref 0.1–1.3)
MONOCYTES NFR BLD: 3 % (ref 4–12)
NEUTS SEG # BLD: 7.7 K/UL (ref 1.7–8.2)
NEUTS SEG NFR BLD: 83 % (ref 43–78)
NITRITE UR QL STRIP.AUTO: NEGATIVE
NRBC # BLD: 0 K/UL (ref 0–0.2)
NRBC # BLD: 0 K/UL (ref 0–0.2)
OTHER OBSERVATIONS,UCOM: ABNORMAL
P-R INTERVAL, ECG05: 128 MS
PH UR STRIP: 6 [PH] (ref 5–9)
PLATELET # BLD AUTO: 124 K/UL (ref 150–450)
PLATELET # BLD AUTO: 142 K/UL (ref 150–450)
PLATELET COMMENTS,PCOM: ADEQUATE
PMV BLD AUTO: 11 FL (ref 9.4–12.3)
PMV BLD AUTO: 11.7 FL (ref 9.4–12.3)
POTASSIUM SERPL-SCNC: 3.4 MMOL/L (ref 3.5–5.1)
PROCALCITONIN SERPL-MCNC: 16.1 NG/ML
PROT SERPL-MCNC: 5.4 G/DL (ref 6.3–8.2)
PROT UR STRIP-MCNC: 100 MG/DL
Q-T INTERVAL, ECG07: 256 MS
QRS DURATION, ECG06: 76 MS
QTC CALCULATION (BEZET), ECG08: 398 MS
RBC # BLD AUTO: 3.2 M/UL (ref 4.05–5.2)
RBC # BLD AUTO: 3.39 M/UL (ref 4.05–5.2)
RBC #/AREA URNS HPF: >100 /HPF
RBC MORPH BLD: ABNORMAL
SODIUM SERPL-SCNC: 142 MMOL/L (ref 136–145)
SP GR UR REFRACTOMETRY: 1.03 (ref 1–1.02)
SPECIMEN SOURCE: NORMAL
UROBILINOGEN UR QL STRIP.AUTO: 1 EU/DL (ref 0.2–1)
VENTRICULAR RATE, ECG03: 146 BPM
WBC # BLD AUTO: 14.5 K/UL (ref 4.3–11.1)
WBC # BLD AUTO: 9.3 K/UL (ref 4.3–11.1)
WBC MORPH BLD: ABNORMAL
WBC URNS QL MICRO: >100 /HPF
YEAST URNS QL MICRO: ABNORMAL

## 2020-01-08 PROCEDURE — 80076 HEPATIC FUNCTION PANEL: CPT

## 2020-01-08 PROCEDURE — 81001 URINALYSIS AUTO W/SCOPE: CPT

## 2020-01-08 PROCEDURE — 93005 ELECTROCARDIOGRAM TRACING: CPT | Performed by: NURSE PRACTITIONER

## 2020-01-08 PROCEDURE — 74011636320 HC RX REV CODE- 636/320: Performed by: UROLOGY

## 2020-01-08 PROCEDURE — C1751 CATH, INF, PER/CENT/MIDLINE: HCPCS

## 2020-01-08 PROCEDURE — 74011250636 HC RX REV CODE- 250/636: Performed by: NURSE PRACTITIONER

## 2020-01-08 PROCEDURE — 85027 COMPLETE CBC AUTOMATED: CPT

## 2020-01-08 PROCEDURE — 36415 COLL VENOUS BLD VENIPUNCTURE: CPT

## 2020-01-08 PROCEDURE — 65270000029 HC RM PRIVATE

## 2020-01-08 PROCEDURE — 87086 URINE CULTURE/COLONY COUNT: CPT

## 2020-01-08 PROCEDURE — 74011000258 HC RX REV CODE- 258: Performed by: UROLOGY

## 2020-01-08 PROCEDURE — 87804 INFLUENZA ASSAY W/OPTIC: CPT

## 2020-01-08 PROCEDURE — 80048 BASIC METABOLIC PNL TOTAL CA: CPT

## 2020-01-08 PROCEDURE — 83605 ASSAY OF LACTIC ACID: CPT

## 2020-01-08 PROCEDURE — 77030018719 HC DRSG PTCH ANTIMIC J&J -A

## 2020-01-08 PROCEDURE — 85025 COMPLETE CBC W/AUTO DIFF WBC: CPT

## 2020-01-08 PROCEDURE — 74011250636 HC RX REV CODE- 250/636: Performed by: UROLOGY

## 2020-01-08 PROCEDURE — 83735 ASSAY OF MAGNESIUM: CPT

## 2020-01-08 PROCEDURE — 74011000258 HC RX REV CODE- 258: Performed by: NURSE PRACTITIONER

## 2020-01-08 PROCEDURE — 65660000000 HC RM CCU STEPDOWN

## 2020-01-08 PROCEDURE — 71045 X-RAY EXAM CHEST 1 VIEW: CPT

## 2020-01-08 PROCEDURE — 84145 PROCALCITONIN (PCT): CPT

## 2020-01-08 PROCEDURE — 76937 US GUIDE VASCULAR ACCESS: CPT

## 2020-01-08 PROCEDURE — 74176 CT ABD & PELVIS W/O CONTRAST: CPT

## 2020-01-08 PROCEDURE — 77030018786 HC NDL GD F/USND BARD -B

## 2020-01-08 PROCEDURE — 99218 HC RM OBSERVATION: CPT

## 2020-01-08 PROCEDURE — 74011250637 HC RX REV CODE- 250/637: Performed by: UROLOGY

## 2020-01-08 RX ORDER — SODIUM CHLORIDE 0.9 % (FLUSH) 0.9 %
10 SYRINGE (ML) INJECTION AS NEEDED
Status: DISCONTINUED | OUTPATIENT
Start: 2020-01-08 | End: 2020-01-13 | Stop reason: HOSPADM

## 2020-01-08 RX ORDER — MAGNESIUM SULFATE HEPTAHYDRATE 40 MG/ML
2 INJECTION, SOLUTION INTRAVENOUS ONCE
Status: COMPLETED | OUTPATIENT
Start: 2020-01-08 | End: 2020-01-08

## 2020-01-08 RX ORDER — HYDROMORPHONE HYDROCHLORIDE 1 MG/ML
0.5 INJECTION, SOLUTION INTRAMUSCULAR; INTRAVENOUS; SUBCUTANEOUS
Status: DISCONTINUED | OUTPATIENT
Start: 2020-01-08 | End: 2020-01-13 | Stop reason: HOSPADM

## 2020-01-08 RX ORDER — ACETAMINOPHEN 325 MG/1
650 TABLET ORAL EVERY 6 HOURS
Status: DISCONTINUED | OUTPATIENT
Start: 2020-01-08 | End: 2020-01-13 | Stop reason: HOSPADM

## 2020-01-08 RX ORDER — DIPHENHYDRAMINE HCL 25 MG
25 CAPSULE ORAL
Status: DISCONTINUED | OUTPATIENT
Start: 2020-01-08 | End: 2020-01-13 | Stop reason: HOSPADM

## 2020-01-08 RX ORDER — SODIUM CHLORIDE 0.9 % (FLUSH) 0.9 %
10 SYRINGE (ML) INJECTION EVERY 8 HOURS
Status: DISCONTINUED | OUTPATIENT
Start: 2020-01-08 | End: 2020-01-13 | Stop reason: HOSPADM

## 2020-01-08 RX ORDER — OXYCODONE HYDROCHLORIDE 5 MG/1
5-15 TABLET ORAL
Status: DISCONTINUED | OUTPATIENT
Start: 2020-01-08 | End: 2020-01-13 | Stop reason: HOSPADM

## 2020-01-08 RX ORDER — POTASSIUM CHLORIDE 14.9 MG/ML
20 INJECTION INTRAVENOUS ONCE
Status: COMPLETED | OUTPATIENT
Start: 2020-01-08 | End: 2020-01-08

## 2020-01-08 RX ADMIN — OXYCODONE HYDROCHLORIDE 5 MG: 5 TABLET ORAL at 16:04

## 2020-01-08 RX ADMIN — ACETAMINOPHEN 650 MG: 325 TABLET, FILM COATED ORAL at 22:53

## 2020-01-08 RX ADMIN — DIPHENHYDRAMINE HYDROCHLORIDE 25 MG: 25 CAPSULE ORAL at 12:30

## 2020-01-08 RX ADMIN — Medication 10 ML: at 05:28

## 2020-01-08 RX ADMIN — PIPERACILLIN AND TAZOBACTAM 4.5 G: 4; .5 INJECTION, POWDER, FOR SOLUTION INTRAVENOUS at 22:55

## 2020-01-08 RX ADMIN — CEFTRIAXONE 2 G: 2 INJECTION, POWDER, FOR SOLUTION INTRAMUSCULAR; INTRAVENOUS at 12:30

## 2020-01-08 RX ADMIN — Medication 10 ML: at 13:23

## 2020-01-08 RX ADMIN — VANCOMYCIN HYDROCHLORIDE 1000 MG: 1 INJECTION, POWDER, LYOPHILIZED, FOR SOLUTION INTRAVENOUS at 11:13

## 2020-01-08 RX ADMIN — ACETAMINOPHEN 650 MG: 325 TABLET, FILM COATED ORAL at 11:17

## 2020-01-08 RX ADMIN — ACETAMINOPHEN 650 MG: 325 TABLET, FILM COATED ORAL at 17:27

## 2020-01-08 RX ADMIN — POTASSIUM CHLORIDE 20 MEQ: 200 INJECTION, SOLUTION INTRAVENOUS at 13:23

## 2020-01-08 RX ADMIN — DIATRIZOATE MEGLUMINE AND DIATRIZOATE SODIUM 15 ML: 660; 100 LIQUID ORAL; RECTAL at 17:27

## 2020-01-08 RX ADMIN — PIPERACILLIN AND TAZOBACTAM 4.5 G: 4; .5 INJECTION, POWDER, FOR SOLUTION INTRAVENOUS at 17:36

## 2020-01-08 RX ADMIN — MORPHINE SULFATE 2 MG: 2 INJECTION, SOLUTION INTRAMUSCULAR; INTRAVENOUS at 03:33

## 2020-01-08 RX ADMIN — TAMSULOSIN HYDROCHLORIDE 0.4 MG: 0.4 CAPSULE ORAL at 08:20

## 2020-01-08 RX ADMIN — OXYCODONE HYDROCHLORIDE 5 MG: 5 TABLET ORAL at 08:20

## 2020-01-08 RX ADMIN — Medication 10 ML: at 22:00

## 2020-01-08 RX ADMIN — MAGNESIUM SULFATE HEPTAHYDRATE 2 G: 40 INJECTION, SOLUTION INTRAVENOUS at 17:26

## 2020-01-08 RX ADMIN — Medication 10 ML: at 17:37

## 2020-01-08 RX ADMIN — Medication 10 ML: at 01:12

## 2020-01-08 RX ADMIN — HYDROMORPHONE HYDROCHLORIDE 0.5 MG: 1 INJECTION, SOLUTION INTRAMUSCULAR; INTRAVENOUS; SUBCUTANEOUS at 17:30

## 2020-01-08 RX ADMIN — HYDROMORPHONE HYDROCHLORIDE 0.5 MG: 1 INJECTION, SOLUTION INTRAMUSCULAR; INTRAVENOUS; SUBCUTANEOUS at 10:45

## 2020-01-08 RX ADMIN — ONDANSETRON 4 MG: 2 INJECTION INTRAMUSCULAR; INTRAVENOUS at 17:30

## 2020-01-08 RX ADMIN — ACETAMINOPHEN 650 MG: 325 TABLET, FILM COATED ORAL at 03:33

## 2020-01-08 RX ADMIN — HYDROMORPHONE HYDROCHLORIDE 0.5 MG: 1 INJECTION, SOLUTION INTRAMUSCULAR; INTRAVENOUS; SUBCUTANEOUS at 21:09

## 2020-01-08 NOTE — PROGRESS NOTES
Pharmacokinetic Consult to Pharmacist    Rudy Correasin is a 20 y.o. female being treated for sepsis related to infected obstructing kidney stone with vancomycin and rocephin. Weight: 47.8 kg (105 lb 6.4 oz)  Lab Results   Component Value Date/Time    BUN 18 01/08/2020 05:41 AM    Creatinine 1.47 (H) 01/08/2020 05:41 AM    WBC 14.5 (H) 01/08/2020 07:49 AM      Estimated Creatinine Clearance: 43.8 mL/min (A) (based on SCr of 1.47 mg/dL (H)). Day 1 of vancomycin. Goal trough is 15-20. Vancomycin dose initiated at 1000 mg X 1, then will dose intermittently based on random levels due to BHAVANA. Next random level tomorrow at 1100 tomorrow. Will continue to follow patient.       Thank you,  Prema Martini, PharmD, [id number]  Clinical Pharmacist  [phone]

## 2020-01-08 NOTE — PROGRESS NOTES
In to assess pt, HR 130s, she appears pale. She is shivering. Abx adjusted as per Dr. Lorenzo Lundborg. IVF increased to 150 ml/hr. 1200- pt having rash after vanc started. Hospitalist consulted for assistance. Pt is ill appearing. Labs improved, however she looks ill. HR still elevated.

## 2020-01-08 NOTE — CONSULTS
History & Physcial   NAME:  Matthew Lezama   Age:  20 y.o.  :   2000   MRN:   [id number]  PCP: Sherlyn Diaz MD  Treatment Team: Attending Provider: Fonda Goldmann, MD; Care Manager: Joana Horner RN; Utilization Review: Cherise aMi RN; Consulting Provider: Major Lucero MD; Nurse Practitioner: Ivy Mike NP  HPI:     Ms. Judy Lacy is a 19 yo female with no significant PMH who was admitted by Urology and underwent a cystoscopy, left ureteral stent placement 20. Was found to be in BHAVANA and creatinine has since trended down. Hospitalist consulted for concern for sepsis today given persistent tachycardia and fever. Pt temp 102.7 after tylenol, tachycardic at 155. Pt was on rocephin and Vanc was added by Urology, pt did have Red Man Syndrome reaction to Vanc. Pt also hypokalemic today. She denies cough, CP, SOB, abd pain other than her original left sided pain she presented with, n/v/d.          Results summary of Diagnostic Studies/Procedures copied from within [facility] EMR:       Complete ROS done and is as stated in HPI or otherwise negative  Past Medical History:   Diagnosis Date    Kidney stone       Past Surgical History:   Procedure Laterality Date    HX CHOLECYSTECTOMY        No Known Allergies   Social History     Tobacco Use    Smoking status: Never Smoker    Smokeless tobacco: Never Used   Substance Use Topics    Alcohol use: Never     Frequency: Never      Family History   Family history unknown: Yes        Objective:     Visit Vitals  /59   Pulse (!) 155   Temp (!) 102.7 °F (39.3 °C)   Resp 20   Wt 47.8 kg (105 lb 6.4 oz)   SpO2 95%   BMI 20.58 kg/m²      Temp (24hrs), Av.3 °F (37.4 °C), Min:97.5 °F (36.4 °C), Max:102.7 °F (39.3 °C)    Oxygen Therapy  O2 Sat (%): 95 % (20 1023)  Pulse via Oximetry: 126 beats per minute (20 1425)  O2 Device: Nasal cannula (20 1405)  O2 Flow Rate (L/min): 2 l/min (20 1405)  Physical Exam:  General:    Alert, cooperative, appears ill  Head:   Normocephalic, without obvious abnormality, atraumatic. Nose:  Nares normal. No drainage or sinus tenderness. Lungs:   CTA, resp even and non labored. Heart:  S1S2 present without murmurs rubs gallops. Tachycardia. Regular rhythm    Abdomen:   Soft, left lower quadrant tenderness, + left CVA tenderness. Not distended. Bowel sounds normal. No annemarie  Extremities: No cyanosis. Moves ext spontaneously  Skin:     Pale, however flushed face  Neurologic: Alert and oriented X4.   No focal deficits         Data Review:   Recent Results (from the past 24 hour(s))   METABOLIC PANEL, BASIC    Collection Time: 01/08/20  5:41 AM   Result Value Ref Range    Sodium 142 136 - 145 mmol/L    Potassium 3.4 (L) 3.5 - 5.1 mmol/L    Chloride 112 (H) 98 - 107 mmol/L    CO2 23 21 - 32 mmol/L    Anion gap 7 7 - 16 mmol/L    Glucose 135 (H) 65 - 100 mg/dL    BUN 18 6 - 23 MG/DL    Creatinine 1.47 (H) 0.6 - 1.0 MG/DL    GFR est AA 58 (L) >60 ml/min/1.73m2    GFR est non-AA 48 (L) >60 ml/min/1.73m2    Calcium 8.0 (L) 8.3 - 10.4 MG/DL   CBC W/O DIFF    Collection Time: 01/08/20  7:49 AM   Result Value Ref Range    WBC 14.5 (H) 4.3 - 11.1 K/uL    RBC 3.20 (L) 4.05 - 5.2 M/uL    HGB 10.1 (L) 11.7 - 15.4 g/dL    HCT 30.6 (L) 35.8 - 46.3 %    MCV 95.6 79.6 - 97.8 FL    MCH 31.6 26.1 - 32.9 PG    MCHC 33.0 31.4 - 35.0 g/dL    RDW 12.6 11.9 - 14.6 %    PLATELET 744 (L) 362 - 450 K/uL    MPV 11.7 9.4 - 12.3 FL    ABSOLUTE NRBC 0.00 0.0 - 0.2 K/uL   EKG, 12 LEAD, INITIAL    Collection Time: 01/08/20 12:55 PM   Result Value Ref Range    Ventricular Rate 146 BPM    Atrial Rate 146 BPM    P-R Interval 128 ms    QRS Duration 76 ms    Q-T Interval 256 ms    QTC Calculation (Bezet) 398 ms    Calculated P Axis 47 degrees    Calculated R Axis 62 degrees    Calculated T Axis 7 degrees    Diagnosis       Sinus tachycardia  Low voltage QRS  Borderline ECG  No previous ECGs available  Confirmed by Elenore Primrose ([id number]) on 1/8/2020 1:26:34 PM     INFLUENZA A & B AG (RAPID TEST)    Collection Time: 01/08/20  1:06 PM   Result Value Ref Range    Influenza A Ag NEGATIVE  NEG      Influenza B Ag NEGATIVE  NEG      Source NASOPHARYNGEAL           Assessment and Plan: Active Hospital Problems    Diagnosis Date Noted    Left ureteral stone 01/07/2020    Sepsis due to urinary tract infection ([address] 01/07/2020         Left ureteral stone  Urology primary  S/p cysto 1/7/20      Sepsis  Check CBC with diff, hepatic function panel, lactic acid, PCT, UA, urine cx, CXR, influenza swab  Will stop rocephin  Add Zosyn to Vanc, Vanc needs to run slowly  Increase IVF to 150 ml/hr    BHAVANA  Creatinine improving  Continue IVF, increase to 150 ml/hr  BMP in AM    Hypokalemia  Replace  Check Mag  BMP in AM    Tachycardia  EKG  Remote tele      Notes, labs, VS, diagnostic testing reviewed  Time spent with pt 35 min       DVT Prophylaxis:  SCD  Plan of Care Discussed with: Supervising MD Dr. Georgette Jaime, care team, pt, mother and family at bedside      Ever Burnett, NP      Addendum     EKG reviewed Sinus tach         Addendum  CXR suspicious for pneumonia, continue Vanc and Zosyn, IVF  Leukocytosis cleared  Mag 1.7, will replace  Lactic acid 1.8   Pt remains tachycardic likely secondary to fever  If pt remains febrile in AM and renal function allows will obtain CT abd/pelvis with contrast      Addendum  Reassessed pt. She is pale, tachycardic. Lost IV access. Order given for PICC line placement. Explained in depth to pt and family test results and plan of care.   Will repeat CXR in AM.

## 2020-01-08 NOTE — PROGRESS NOTES
Evaluated, tremulous, tachy, shivering, in pain, will repeat CT AP with oral contrast, continued antibiotics, IVF, followup cultures and labs  Ricki Arana MD

## 2020-01-08 NOTE — PROGRESS NOTES
Temp 102.7 after Tylenol given. Pt's skin is flushed all over body, and pt still has a pale color to skin. Placed call to ALEXANDER Maki to make aware. She will place orders. Pt is A/o x4. Family is present.

## 2020-01-08 NOTE — OP NOTES
[address] [place] [address]  OPERATIVE REPORT    Name:  Ozzie Bence  MR#:  [id number]  :  2000  ACCOUNT #:  [de-identified]  DATE OF SERVICE:  2020    PREOPERATIVE DIAGNOSES:  Left ureter stone and left urosepsis. POSTOPERATIVE DIAGNOSES:  Left ureter stone and left urosepsis. PROCEDURES PERFORMED:  Cystoscopy, left ureteral stent placement. SURGEON:  Selvin Larson MD    ASSISTANT:  None. ANESTHESIA:  General.    COMPLICATIONS:  None. SPECIMENS REMOVED:  Urine sent for culture. IMPLANTS:  6 x 24 double-J left ureteral stent with a small portion of string attached. ESTIMATED BLOOD LOSS:  1 mL. FINDINGS:  Left proximal ureter calcification seen on KUB in the OR. Unremarkable left stent placement. INDICATIONS FOR OPERATIVE PROCEDURE:  The patient is a 79-year-old female with an obstructing 4-mm proximal left ureter stone seen on CT scan who presented urgently to the clinic today with fevers of 102, tachycardia, and hypotension, concern for urosepsis from her stone. She was emergently transferred to the preoperative holding area and taken emergently back to the operating room for cystoscopy, left ureteral stent placement. DESCRIPTION OF OPERATIVE PROCEDURE:  After informed consent was obtained, the correct patient was identified in the preoperative holding area. She was taken back to the operating suite and placed on the table in the supine position. Time-out was performed confirming the correct patient and planned procedure. She received 1 g of IV Rocephin prior to smooth induction of general endotracheal anesthesia. She was moved into the dorsal lithotomy position and prepped and draped in the usual sterile fashion. I began the case by inserting a 22-Guinean rigid cystoscope with a 30-degree lens in the urethra and advanced into the patient's bladder. Pancystoscopy was performed which revealed cloudy urine, sample was sent for culture.   I then cannulated the left ureteral orifice with a sensor wire and passed it under fluoroscopic guidance in the left renal pelvis. Once the wire was in position, I then left the scope loaded on to the wire and passed a 6 x 24 double-J left ureteral stent with a small portion of the string attached under fluoroscopic guidance into the left renal pelvis. Once the stent was in position, I pulled the wire noting good curl in the left renal pelvis as well as the patient's bladder. The stent was effluxing cloudy urine at the end of the case. Given the concern for sepsis, I did leave a 16-Palestinian Bright catheter which was placed under sterile technique with 10 mL sterile water in the balloon to provide optimal drainage to her urinary tract system. She was then awoken from anesthesia, transferred to PACU in stable condition. She tolerated the procedure well. There were no complications. All counts were correct at the end of the procedure.       Aydin Nj MD      [id number]  D:  01/07/2020 13:43  T:  01/08/2020 0:01  JOB #:  [id number]

## 2020-01-08 NOTE — PROGRESS NOTES
MIDLINE Placement Note    PRE-PROCEDURE VERIFICATION  PROCEDURE DETAIL  Time out completed with Radha Perez RN and everyone in agreement with procedure. A single lumen Midline was started for vascular access.  The following documentation is in addition to the Midline properties in the lines/airways flowsheet :  Lot #: [id number]  Xylocaine used: yes  Mid-Arm Circumference: 25 (cm)  Internal Catheter Length: 10 (cm)  Internal Catheter Total Length: 10 (cm)  Vein Selection for Midline:right basilic        Line is okay to use: yes

## 2020-01-08 NOTE — PROGRESS NOTES
Hourly rounds completed. All needs met. Pt c/o pain. Interventions per MAR. PRN tylenol also given for elevated temp and \"chills\". Encouraged pt to ambulate once pain is controlled on the following shift. Will give report to the oncoming day shift nurse.

## 2020-01-08 NOTE — PROGRESS NOTES
Urology Progress Note    Admit Date: 1/7/2020    Subjective:     Patient reports L back pain this AM and bladder spasms from catheter/stent. States pain meds only help 1-2 hours and then wear off. T max overnight 100. No nausea/emesis. Has not ambulated. Has not eaten since surgery. Objective:     Patient Vitals for the past 8 hrs:   BP Temp Pulse Resp SpO2   01/08/20 0345 90/56 99.7 °F (37.6 °C) (!) 110 16 96 %   01/08/20 0022 91/51  93     01/08/20 0008 (!) 85/48 98.4 °F (36.9 °C) (!) 101 17 97 %   01/07/20 2157 93/54 100 °F (37.8 °C) (!) 116 16 99 %     01/07 1901 - 01/08 0700  In: -   Out: 1000 [Urine:1000]  01/06 0701 - 01/07 1900  In: 7336 [I.V.:1650]  Out: 5     Physical Exam:     Visit Vitals  BP 90/56   Pulse (!) 110   Temp 99.7 °F (37.6 °C)   Resp 16   SpO2 96%        GENERAL: No acute distress, Awake, Alert, Oriented X 3  CARDIAC: regular rate and rhythm  CHEST AND LUNG: Easy work of breathing, clear to auscultation bilaterally, no cyanosis  ABDOMEN: soft, tender on L side of abdomen, non-distended, positive bowel sounds, no organomegaly, no palpable masses, no guarding, no rebound tenderness  : Bright catheter draining cloudy yellow urine.    SKIN: No rash, no erythema, no lacerations or abrasions, no ecchymosis  NEUROLOGIC: cranial nerves 2-12 grossly intact           Data Review   Recent Results (from the past 24 hour(s))   AMB POC URINALYSIS DIP STICK AUTO W/ MICRO (PGU)    Collection Time: 01/07/20 11:08 AM   Result Value Ref Range    Glucose (UA POC) Negative Negative mg/dL    Bilirubin (UA POC) Small Negative    Ketones (UA POC) Negative Negative    Specific gravity (UA POC) 1.030 1.001 - 1.035    Blood (UA POC) Small Negative    pH (UA POC) 5.5 4.6 - 8.0    Protein (UA POC) 300  Negative    Urobilinogen (POC) 0.2 mg/dL     Nitrites (UA POC) Negative Negative    Leukocyte esterase (UA POC) Negative Negative   CBC W/O DIFF    Collection Time: 01/07/20 12:17 PM   Result Value Ref Range WBC 23.2 (H) 4.3 - 11.1 K/uL    RBC 4.25 4.05 - 5.2 M/uL    HGB 13.4 11.7 - 15.4 g/dL    HCT 40.3 35.8 - 46.3 %    MCV 94.8 79.6 - 97.8 FL    MCH 31.5 26.1 - 32.9 PG    MCHC 33.3 31.4 - 35.0 g/dL    RDW 12.4 11.9 - 14.6 %    PLATELET 724 456 - 079 K/uL    MPV 11.8 9.4 - 12.3 FL    ABSOLUTE NRBC 0.00 0.0 - 0.2 K/uL   METABOLIC PANEL, BASIC    Collection Time: 01/07/20 12:17 PM   Result Value Ref Range    Sodium 137 136 - 145 mmol/L    Potassium 4.1 3.5 - 5.1 mmol/L    Chloride 104 98 - 107 mmol/L    CO2 22 21 - 32 mmol/L    Anion gap 11 7 - 16 mmol/L    Glucose 91 65 - 100 mg/dL    BUN 24 (H) 6 - 23 MG/DL    Creatinine 2.28 (H) 0.6 - 1.0 MG/DL    GFR est AA 35 (L) >60 ml/min/1.73m2    GFR est non-AA 29 (L) >60 ml/min/1.73m2    Calcium 8.8 8.3 - 10.4 MG/DL   HCG URINE, QL. - POC    Collection Time: 01/07/20 12:18 PM   Result Value Ref Range    Pregnancy test,urine (POC) NEGATIVE  NEG             Assessment:     Active Problems:    Left ureteral stone (1/7/2020)      Sepsis due to urinary tract infection ([facility] [address] (1/7/2020)      20year old female with 4 mm L proximal ureteral stone and concern for urosepsis POD 1 s/p emergent L ureteral stent placement. Having L flank pain this AM and bladder spasms. Plan:     -F/U AM labs and urine/blood cultures   -Trend Cr and WBC  -Continue IV rocephin and may consider broadening if WBC not improving  -Continue bergman catheter to drainage  -Regular diet  -Will increase pain medication  -Anti-spasmodics PRN  -Dispo: Anticipated discharge Friday. Sorin Bui M.D.     [facility]  [address] [place] [address], [facility] [address]  Phone: [phone]  Fax: [phone]

## 2020-01-09 ENCOUNTER — APPOINTMENT (OUTPATIENT)
Dept: GENERAL RADIOLOGY | Age: 20
DRG: 853 | End: 2020-01-09
Attending: NURSE PRACTITIONER
Payer: COMMERCIAL

## 2020-01-09 PROBLEM — J90 BILATERAL PLEURAL EFFUSION: Status: ACTIVE | Noted: 2020-01-09

## 2020-01-09 PROBLEM — R09.02 HYPOXIA: Status: ACTIVE | Noted: 2020-01-09

## 2020-01-09 LAB
ANION GAP SERPL CALC-SCNC: 7 MMOL/L (ref 7–16)
BACTERIA SPEC CULT: NORMAL
BASOPHILS # BLD: 0 K/UL (ref 0–0.2)
BASOPHILS NFR BLD: 0 % (ref 0–2)
BNP SERPL-MCNC: 1517 PG/ML (ref 5–125)
BUN SERPL-MCNC: 14 MG/DL (ref 6–23)
CALCIUM SERPL-MCNC: 7.7 MG/DL (ref 8.3–10.4)
CHLORIDE SERPL-SCNC: 111 MMOL/L (ref 98–107)
CO2 SERPL-SCNC: 22 MMOL/L (ref 21–32)
CREAT SERPL-MCNC: 1.49 MG/DL (ref 0.6–1)
DIFFERENTIAL METHOD BLD: ABNORMAL
EOSINOPHIL # BLD: 0.2 K/UL (ref 0–0.8)
EOSINOPHIL NFR BLD: 2 % (ref 0.5–7.8)
ERYTHROCYTE [DISTWIDTH] IN BLOOD BY AUTOMATED COUNT: 12.8 % (ref 11.9–14.6)
GLUCOSE SERPL-MCNC: 105 MG/DL (ref 65–100)
HCT VFR BLD AUTO: 29.8 % (ref 35.8–46.3)
HGB BLD-MCNC: 9.9 G/DL (ref 11.7–15.4)
IMM GRANULOCYTES # BLD AUTO: 0.1 K/UL (ref 0–0.5)
IMM GRANULOCYTES NFR BLD AUTO: 1 % (ref 0–5)
LACTATE SERPL-SCNC: 1.1 MMOL/L (ref 0.4–2)
LYMPHOCYTES # BLD: 1 K/UL (ref 0.5–4.6)
LYMPHOCYTES NFR BLD: 11 % (ref 13–44)
MAGNESIUM SERPL-MCNC: 2.2 MG/DL (ref 1.8–2.4)
MCH RBC QN AUTO: 31.3 PG (ref 26.1–32.9)
MCHC RBC AUTO-ENTMCNC: 33.2 G/DL (ref 31.4–35)
MCV RBC AUTO: 94.3 FL (ref 79.6–97.8)
MONOCYTES # BLD: 0.5 K/UL (ref 0.1–1.3)
MONOCYTES NFR BLD: 6 % (ref 4–12)
NEUTS SEG # BLD: 7.3 K/UL (ref 1.7–8.2)
NEUTS SEG NFR BLD: 81 % (ref 43–78)
NRBC # BLD: 0 K/UL (ref 0–0.2)
PLATELET # BLD AUTO: 132 K/UL (ref 150–450)
PMV BLD AUTO: 11 FL (ref 9.4–12.3)
POTASSIUM SERPL-SCNC: 3.8 MMOL/L (ref 3.5–5.1)
RBC # BLD AUTO: 3.16 M/UL (ref 4.05–5.2)
SERVICE CMNT-IMP: NORMAL
SODIUM SERPL-SCNC: 140 MMOL/L (ref 136–145)
VANCOMYCIN SERPL-MCNC: 6.2 UG/ML
WBC # BLD AUTO: 9.1 K/UL (ref 4.3–11.1)

## 2020-01-09 PROCEDURE — 74011000258 HC RX REV CODE- 258: Performed by: NURSE PRACTITIONER

## 2020-01-09 PROCEDURE — 83605 ASSAY OF LACTIC ACID: CPT

## 2020-01-09 PROCEDURE — 99223 1ST HOSP IP/OBS HIGH 75: CPT | Performed by: INTERNAL MEDICINE

## 2020-01-09 PROCEDURE — 83880 ASSAY OF NATRIURETIC PEPTIDE: CPT

## 2020-01-09 PROCEDURE — 94760 N-INVAS EAR/PLS OXIMETRY 1: CPT

## 2020-01-09 PROCEDURE — 65660000000 HC RM CCU STEPDOWN

## 2020-01-09 PROCEDURE — 74011250636 HC RX REV CODE- 250/636: Performed by: UROLOGY

## 2020-01-09 PROCEDURE — 80202 ASSAY OF VANCOMYCIN: CPT

## 2020-01-09 PROCEDURE — 93306 TTE W/DOPPLER COMPLETE: CPT

## 2020-01-09 PROCEDURE — 71046 X-RAY EXAM CHEST 2 VIEWS: CPT

## 2020-01-09 PROCEDURE — 74011250637 HC RX REV CODE- 250/637: Performed by: UROLOGY

## 2020-01-09 PROCEDURE — 36415 COLL VENOUS BLD VENIPUNCTURE: CPT

## 2020-01-09 PROCEDURE — 74011250637 HC RX REV CODE- 250/637: Performed by: NURSE PRACTITIONER

## 2020-01-09 PROCEDURE — 74011000250 HC RX REV CODE- 250: Performed by: INTERNAL MEDICINE

## 2020-01-09 PROCEDURE — 77010033678 HC OXYGEN DAILY

## 2020-01-09 PROCEDURE — 80048 BASIC METABOLIC PNL TOTAL CA: CPT

## 2020-01-09 PROCEDURE — 74011250636 HC RX REV CODE- 250/636: Performed by: INTERNAL MEDICINE

## 2020-01-09 PROCEDURE — 65270000029 HC RM PRIVATE

## 2020-01-09 PROCEDURE — 74011250636 HC RX REV CODE- 250/636: Performed by: NURSE PRACTITIONER

## 2020-01-09 PROCEDURE — 85025 COMPLETE CBC W/AUTO DIFF WBC: CPT

## 2020-01-09 PROCEDURE — P9047 ALBUMIN (HUMAN), 25%, 50ML: HCPCS | Performed by: NURSE PRACTITIONER

## 2020-01-09 PROCEDURE — 83735 ASSAY OF MAGNESIUM: CPT

## 2020-01-09 RX ORDER — POTASSIUM CHLORIDE 20 MEQ/1
20 TABLET, EXTENDED RELEASE ORAL DAILY
Status: DISCONTINUED | OUTPATIENT
Start: 2020-01-09 | End: 2020-01-13

## 2020-01-09 RX ORDER — ALBUMIN HUMAN 250 G/1000ML
12.5 SOLUTION INTRAVENOUS EVERY 12 HOURS
Status: COMPLETED | OUTPATIENT
Start: 2020-01-09 | End: 2020-01-10

## 2020-01-09 RX ORDER — CEFTRIAXONE 1 G/1
1 INJECTION, POWDER, FOR SOLUTION INTRAMUSCULAR; INTRAVENOUS EVERY 24 HOURS
Status: DISCONTINUED | OUTPATIENT
Start: 2020-01-09 | End: 2020-01-09 | Stop reason: CLARIF

## 2020-01-09 RX ORDER — VANCOMYCIN/0.9 % SOD CHLORIDE 750 MG/250
750 PLASTIC BAG, INJECTION (ML) INTRAVENOUS EVERY 12 HOURS
Status: DISCONTINUED | OUTPATIENT
Start: 2020-01-09 | End: 2020-01-09

## 2020-01-09 RX ORDER — FUROSEMIDE 10 MG/ML
20 INJECTION INTRAMUSCULAR; INTRAVENOUS EVERY 8 HOURS
Status: DISCONTINUED | OUTPATIENT
Start: 2020-01-09 | End: 2020-01-10

## 2020-01-09 RX ADMIN — Medication 10 ML: at 21:49

## 2020-01-09 RX ADMIN — OXYCODONE HYDROCHLORIDE 10 MG: 5 TABLET ORAL at 23:44

## 2020-01-09 RX ADMIN — ONDANSETRON 4 MG: 2 INJECTION INTRAMUSCULAR; INTRAVENOUS at 16:58

## 2020-01-09 RX ADMIN — ONDANSETRON 4 MG: 2 INJECTION INTRAMUSCULAR; INTRAVENOUS at 12:30

## 2020-01-09 RX ADMIN — PIPERACILLIN AND TAZOBACTAM 4.5 G: 4; .5 INJECTION, POWDER, FOR SOLUTION INTRAVENOUS at 05:55

## 2020-01-09 RX ADMIN — FUROSEMIDE 20 MG: 10 INJECTION, SOLUTION INTRAMUSCULAR; INTRAVENOUS at 13:57

## 2020-01-09 RX ADMIN — POTASSIUM CHLORIDE 20 MEQ: 20 TABLET, EXTENDED RELEASE ORAL at 13:57

## 2020-01-09 RX ADMIN — ALBUMIN (HUMAN) 12.5 G: 0.25 INJECTION, SOLUTION INTRAVENOUS at 12:30

## 2020-01-09 RX ADMIN — FUROSEMIDE 20 MG: 10 INJECTION, SOLUTION INTRAMUSCULAR; INTRAVENOUS at 21:41

## 2020-01-09 RX ADMIN — ACETAMINOPHEN 650 MG: 325 TABLET, FILM COATED ORAL at 11:54

## 2020-01-09 RX ADMIN — OXYCODONE HYDROCHLORIDE 10 MG: 5 TABLET ORAL at 06:05

## 2020-01-09 RX ADMIN — CEFTRIAXONE 1 G: 1 INJECTION, POWDER, FOR SOLUTION INTRAMUSCULAR; INTRAVENOUS at 14:37

## 2020-01-09 RX ADMIN — OXYCODONE HYDROCHLORIDE 10 MG: 5 TABLET ORAL at 15:21

## 2020-01-09 RX ADMIN — ALBUMIN (HUMAN) 12.5 G: 0.25 INJECTION, SOLUTION INTRAVENOUS at 21:42

## 2020-01-09 RX ADMIN — OXYCODONE HYDROCHLORIDE 10 MG: 5 TABLET ORAL at 09:02

## 2020-01-09 RX ADMIN — ACETAMINOPHEN 650 MG: 325 TABLET, FILM COATED ORAL at 05:55

## 2020-01-09 RX ADMIN — ONDANSETRON 4 MG: 2 INJECTION INTRAMUSCULAR; INTRAVENOUS at 09:07

## 2020-01-09 RX ADMIN — ACETAMINOPHEN 650 MG: 325 TABLET, FILM COATED ORAL at 23:30

## 2020-01-09 RX ADMIN — ACETAMINOPHEN 650 MG: 325 TABLET, FILM COATED ORAL at 17:02

## 2020-01-09 RX ADMIN — PROMETHAZINE HYDROCHLORIDE 12.5 MG: 25 INJECTION INTRAMUSCULAR; INTRAVENOUS at 21:41

## 2020-01-09 RX ADMIN — TAMSULOSIN HYDROCHLORIDE 0.4 MG: 0.4 CAPSULE ORAL at 09:03

## 2020-01-09 RX ADMIN — OXYCODONE HYDROCHLORIDE 10 MG: 5 TABLET ORAL at 02:58

## 2020-01-09 NOTE — PROGRESS NOTES
Pharmacokinetic Consult to Pharmacist    Jovanny Dahl is a 20 y.o. female being treated for sepsis with Vancomycin. Weight: 51 kg (112 lb 8 oz)  Lab Results   Component Value Date/Time    BUN 14 01/09/2020 05:08 AM    Creatinine 1.49 (H) 01/09/2020 05:08 AM    WBC 9.1 01/09/2020 05:08 AM    Procalcitonin 16.10 01/08/2020 02:09 PM    Lactic acid 1.1 01/09/2020 11:21 AM      Estimated Creatinine Clearance: 43.3 mL/min (A) (based on SCr of 1.49 mg/dL (H)). Lab Results   Component Value Date/Time    Vancomycin, random 6.2 01/09/2020 11:21 AM         Day 2 of vancomycin. Goal trough is 15-20. Vancomycin random level resulted at 6.2, so will schedule 750 mg Q12H. Plan trough prior to the 4th dose pending trend in renal function. Will continue to follow patient.       Thank you,  Linda Mejia, PharmD, [address]  Clinical Pharmacist  [phone]

## 2020-01-09 NOTE — PROGRESS NOTES
Pt evaluated by pulmonology, spoke with them at desk. No concern for PNA. Urine culture now growing E. Coli sensitive to ceftriaxone. Will d/c vanc, zosyn, and start ceftriaxone.

## 2020-01-09 NOTE — PROGRESS NOTES
Progress Note    2020  Admit Date: 2020 11:51 AM   NAME: Juanita Haq   :  2000   MRN:  [id number]   Attending: Ky Singletary MD  PCP:  Lachelle Medina MD  Treatment Team: Attending Provider: Ky Singletary MD; Care Manager: Vanita Meng, SON; Utilization Review: Lena Nj RN; Consulting Provider: Drue Romberg, MD; Nurse Practitioner: Max Lambert NP; Staff Nurse: Pinky Mota      SUBJECTIVE:   Ms. Tom Cintron is a 19 yo female with no significant PMH who was admitted by Urology and underwent a cystoscopy, left ureteral stent placement 20. Was found to be in BHAVANA and creatinine has since trended down. Hospitalist consulted for concern for sepsis 20 given persistent tachycardia and fever. Pt temp 102.7 after tylenol, tachycardic at 155. Pt was on rocephin and Vanc was added by Urology, pt did have Red Man Syndrome reaction to Vanc. Also hypomagnesemic and hypokalemic, replaced and resolved. UA consistent with UTI. BC NGTD. Lactic acid 1.8, PCT 16. CXR showed concern for pneumonia. 20 CT abd/pelvis without contrast showed early pulmonary edema, ureteral stent in position. 20 Rocephin stopped and Zosyn added to Vanc. IVF decreased to 75 ml/hr from 150 ml/hr. Repeat CXR shows worsening retrocardiac opacity left lung base atelectasis vs infection and pulmonary edema, small left greater than right pleural effusions worsened as well. She was placed on oxygen last night with normal O2 sats per nursing for \"comfort\". Today she went to xray off O2, came back with sats 85% on RA. O2 placed back on pt. Had midline placed 20. She denies cough, CP, SOB, abd pain other than her original left sided pain she presented with, n/v/d. She appears acutely ill however does appear improved from yesterday.       Past Medical History:   Diagnosis Date    Kidney stone      Recent Results (from the past 24 hour(s))   EKG, 12 LEAD, INITIAL    Collection Time: 01/08/20 12:55 PM   Result Value Ref Range    Ventricular Rate 146 BPM    Atrial Rate 146 BPM    P-R Interval 128 ms    QRS Duration 76 ms    Q-T Interval 256 ms    QTC Calculation (Bezet) 398 ms    Calculated P Axis 47 degrees    Calculated R Axis 62 degrees    Calculated T Axis 7 degrees    Diagnosis       Sinus tachycardia  Low voltage QRS  Borderline ECG  No previous ECGs available  Confirmed by Bina Levi ([id number]) on 1/8/2020 1:26:34 PM     INFLUENZA A & B AG (RAPID TEST)    Collection Time: 01/08/20  1:06 PM   Result Value Ref Range    Influenza A Ag NEGATIVE  NEG      Influenza B Ag NEGATIVE  NEG      Source NASOPHARYNGEAL     LACTIC ACID    Collection Time: 01/08/20  2:09 PM   Result Value Ref Range    Lactic acid 1.8 0.4 - 2.0 MMOL/L   MAGNESIUM    Collection Time: 01/08/20  2:09 PM   Result Value Ref Range    Magnesium 1.7 (L) 1.8 - 2.4 mg/dL   CBC WITH AUTOMATED DIFF    Collection Time: 01/08/20  2:09 PM   Result Value Ref Range    WBC 9.3 4.3 - 11.1 K/uL    RBC 3.39 (L) 4.05 - 5.2 M/uL    HGB 10.8 (L) 11.7 - 15.4 g/dL    HCT 32.2 (L) 35.8 - 46.3 %    MCV 95.0 79.6 - 97.8 FL    MCH 31.9 26.1 - 32.9 PG    MCHC 33.5 31.4 - 35.0 g/dL    RDW 12.6 11.9 - 14.6 %    PLATELET 629 (L) 306 - 450 K/uL    MPV 11.0 9.4 - 12.3 FL    ABSOLUTE NRBC 0.00 0.0 - 0.2 K/uL    NEUTROPHILS 83 (H) 43 - 78 %    LYMPHOCYTES 12 (L) 13 - 44 %    MONOCYTES 3 (L) 4.0 - 12.0 %    EOSINOPHILS 0 (L) 0.5 - 7.8 %    BASOPHILS 0 0.0 - 2.0 %    IMMATURE GRANULOCYTES 2 0.0 - 5.0 %    ABS. NEUTROPHILS 7.7 1.7 - 8.2 K/UL    ABS. LYMPHOCYTES 1.1 0.5 - 4.6 K/UL    ABS. MONOCYTES 0.3 0.1 - 1.3 K/UL    ABS. EOSINOPHILS 0.0 0.0 - 0.8 K/UL    ABS. BASOPHILS 0.0 0.0 - 0.2 K/UL    ABS. IMM.  GRANS. 0.2 0.0 - 0.5 K/UL    RBC COMMENTS NORMOCYTIC/NORMOCHROMIC      WBC COMMENTS Result Confirmed By Smear      PLATELET COMMENTS ADEQUATE      DF AUTOMATED     HEPATIC FUNCTION PANEL    Collection Time: 01/08/20  2:09 PM   Result Value Ref Range Protein, total 5.4 (L) 6.3 - 8.2 g/dL    Albumin 2.1 (L) 3.5 - 5.0 g/dL    Globulin 3.3 2.3 - 3.5 g/dL    A-G Ratio 0.6 (L) 1.2 - 3.5      Bilirubin, total 0.8 0.2 - 1.1 MG/DL    Bilirubin, direct 0.3 <0.4 MG/DL    Alk. phosphatase 82 50 - 136 U/L    AST (SGOT) 17 15 - 37 U/L    ALT (SGPT) 17 12 - 65 U/L   PROCALCITONIN    Collection Time: 01/08/20  2:09 PM   Result Value Ref Range    Procalcitonin 16.10 ng/mL   URINALYSIS W/ RFLX MICROSCOPIC    Collection Time: 01/08/20  5:45 PM   Result Value Ref Range    Color LIS      Appearance TURBID      Specific gravity 1.028 (H) 1.001 - 1.023      pH (UA) 6.0 5.0 - 9.0      Protein 100 (A) NEG mg/dL    Glucose NEGATIVE  mg/dL    Ketone NEGATIVE  NEG mg/dL    Bilirubin NEGATIVE  NEG      Blood LARGE (A) NEG      Urobilinogen 1.0 0.2 - 1.0 EU/dL    Nitrites NEGATIVE  NEG      Leukocyte Esterase SMALL (A) NEG      WBC >100 0 /hpf    RBC >100 0 /hpf    Epithelial cells 3-5 0 /hpf    Bacteria 4+ (H) 0 /hpf    Amorphous Crystals 2+ (H) 0    Yeast OCCASIONAL      Other observations RESULTS VERIFIED MANUALLY     CULTURE, URINE    Collection Time: 01/08/20  5:45 PM   Result Value Ref Range    Special Requests: GREY TUBE     Culture result:        NO GROWTH AFTER SHORT PERIOD OF INCUBATION. FURTHER RESULTS TO FOLLOW AFTER OVERNIGHT INCUBATION.    METABOLIC PANEL, BASIC    Collection Time: 01/09/20  5:08 AM   Result Value Ref Range    Sodium 140 136 - 145 mmol/L    Potassium 3.8 3.5 - 5.1 mmol/L    Chloride 111 (H) 98 - 107 mmol/L    CO2 22 21 - 32 mmol/L    Anion gap 7 7 - 16 mmol/L    Glucose 105 (H) 65 - 100 mg/dL    BUN 14 6 - 23 MG/DL    Creatinine 1.49 (H) 0.6 - 1.0 MG/DL    GFR est AA 57 (L) >60 ml/min/1.73m2    GFR est non-AA 47 (L) >60 ml/min/1.73m2    Calcium 7.7 (L) 8.3 - 10.4 MG/DL   CBC WITH AUTOMATED DIFF    Collection Time: 01/09/20  5:08 AM   Result Value Ref Range    WBC 9.1 4.3 - 11.1 K/uL    RBC 3.16 (L) 4.05 - 5.2 M/uL    HGB 9.9 (L) 11.7 - 15.4 g/dL    HCT 29.8 (L) 35.8 - 46.3 %    MCV 94.3 79.6 - 97.8 FL    MCH 31.3 26.1 - 32.9 PG    MCHC 33.2 31.4 - 35.0 g/dL    RDW 12.8 11.9 - 14.6 %    PLATELET 712 (L) 219 - 450 K/uL    MPV 11.0 9.4 - 12.3 FL    ABSOLUTE NRBC 0.00 0.0 - 0.2 K/uL    DF AUTOMATED      NEUTROPHILS 81 (H) 43 - 78 %    LYMPHOCYTES 11 (L) 13 - 44 %    MONOCYTES 6 4.0 - 12.0 %    EOSINOPHILS 2 0.5 - 7.8 %    BASOPHILS 0 0.0 - 2.0 %    IMMATURE GRANULOCYTES 1 0.0 - 5.0 %    ABS. NEUTROPHILS 7.3 1.7 - 8.2 K/UL    ABS. LYMPHOCYTES 1.0 0.5 - 4.6 K/UL    ABS. MONOCYTES 0.5 0.1 - 1.3 K/UL    ABS. EOSINOPHILS 0.2 0.0 - 0.8 K/UL    ABS. BASOPHILS 0.0 0.0 - 0.2 K/UL    ABS. IMM.  GRANS. 0.1 0.0 - 0.5 K/UL   MAGNESIUM    Collection Time: 01/09/20  5:08 AM   Result Value Ref Range    Magnesium 2.2 1.8 - 2.4 mg/dL     No Known Allergies  Current Facility-Administered Medications   Medication Dose Route Frequency Provider Last Rate Last Dose    acetaminophen (TYLENOL) tablet 650 mg  650 mg Oral Q6H Yecenia Yanez MD   650 mg at 01/09/20 0555    oxyCODONE IR (ROXICODONE) tablet 5-15 mg  5-15 mg Oral Q3H PRN Yecenia Yanez MD   10 mg at 01/09/20 0902    HYDROmorphone (PF) (DILAUDID) injection 0.5 mg  0.5 mg IntraVENous Q2H PRN Yecenia Yanez MD   0.5 mg at 01/08/20 2109    Vancomycin Pharmacy Intermittent Dosing   Other Rx Dosing/Monitoring Yecenia Yanez MD        diphenhydrAMINE (BENADRYL) capsule 25 mg  25 mg Oral Q6H PRN Yecenia Yanez MD   25 mg at 01/08/20 1230    piperacillin-tazobactam (ZOSYN) 4.5 g in 0.9% sodium chloride (MBP/ADV) 100 mL  4.5 g IntraVENous Q8H Annette REYES, NP 25 mL/hr at 01/09/20 0555 4.5 g at 01/09/20 0555    sodium chloride (NS) flush 10 mL  10 mL InterCATHeter Q8H Annette Heckle B, NP   10 mL at 01/08/20 2200    sodium chloride (NS) flush 10 mL  10 mL InterCATHeter PRN Kodi Walker NP        tamsulosin [facility]) capsule 0.4 mg  0.4 mg Oral DAILY Yecenia Yanez MD   0.4 mg at 01/09/20 0338    sodium chloride (NS) flush 5-40 mL  5-40 mL IntraVENous Q8H Little Sheikh MD   10 mL at 20 1323    sodium chloride (NS) flush 5-40 mL  5-40 mL IntraVENous PRN Little Sheikh MD        [facility]) injection 0.4 mg  0.4 mg IntraVENous PRN Little Sheikh MD        ondansetron [facility]) injection 4 mg  4 mg IntraVENous Q4H PRN Little Sheikh MD   4 mg at 20 0907    senna-docusate (PERICOLACE) 8.6-50 mg per tablet 1 Tab  1 Tab Oral BID PRN Little Sheikh MD        hyoscyamine SL (LEVSIN/SL) tablet 0.125 mg  0.125 mg SubLINGual Q4H PRN Little Sheikh MD        0.9% sodium chloride infusion  75 mL/hr IntraVENous CONTINUOUS Judy Poole MD 75 mL/hr at 20 2255 75 mL/hr at 20 2255       Review of Systems negative with exception of pertinent positives noted above  PHYSICAL EXAM     Visit Vitals  /68 (BP 1 Location: Left arm, BP Patient Position: Head of bed elevated (Comment degrees))   Pulse (!) 118   Temp 98.6 °F (37 °C)   Resp 18   Wt 51 kg (112 lb 8 oz)   SpO2 (!) 85%   BMI 21.97 kg/m²      Temp (24hrs), Av.5 °F (37.5 °C), Min:98.6 °F (37 °C), Max:100.9 °F (38.3 °C)    Oxygen Therapy  O2 Sat (%): (!) 85 % (20 1037)  Pulse via Oximetry: 126 beats per minute (20 1425)  O2 Device: Nasal cannula (20 1405)  O2 Flow Rate (L/min): 2 l/min (20 1405)    Intake/Output Summary (Last 24 hours) at 2020 1114  Last data filed at 2020 0402  Gross per 24 hour   Intake    Output 675 ml   Net -675 ml        General:       Alert, cooperative, appears ill  Head:            Normocephalic, without obvious abnormality, atraumatic. Nose:            Nares normal. No drainage or sinus tenderness. Lungs:          CTA, resp even and non labored. Heart:            S1S2 present without murmurs rubs gallops. Tachycardia. Regular rhythm. Trace LE and hand edema  Abdomen:    Soft, left lower quadrant tenderness, + left CVA tenderness. Not distended.   Bowel sounds normal. No masses  Extremities: No cyanosis. Moves ext spontaneously  Skin:             Pale skin, lips pink  Neurologic:  Alert and oriented X4. No focal deficits       Results summary of Diagnostic Studies/Procedures copied from within [facility] EMR:      [address] Problems    Diagnosis Date Noted    Sepsis ([facility] [address] 01/08/2020    Left ureteral stone 01/07/2020    Sepsis due to urinary tract infection ([facility] [address] 01/07/2020     Plan:  Left ureteral stone  Urology primary  S/p cysto 1/7/20        Sepsis  Leukocytosis resolved  Lactic acid 1.8, PCT 16  Flu neg  UA +UTI  Follow urine cx, BC  1/8/20 stop rocephin  1/8/20 Add Zosyn to Vanc, Vanc needs to run slowly  Continue IVF at 75 ml/hr given sepsis  1/8/20 CT abd/pelvis without contrast showed early pulmonary edema, ureteral stent in position.     BHAVANA  Creatinine improving/stable  BMP in AM  Continue IVF     Hypokalemia  Replaced, resolved  BMP in AM    Hypomagnesemic  Replaced, resolved     Tachycardia  EKG ST 1/8/20  Remote tele  Likely secondary to fever, sepsis    Pulmonary edema/acute resp failure with hypoxia  CXR worsening pulmonary edema/effusions, and atelectasis vs pneumonia  Continue IVF at 75 ml/hr for now given sepsis  May need to stop fluids and give lasix if no improvement  Will check echo   Check ABG      Notes, labs, VS, diagnostic testing reviewed  Time spent with pt 35 min          DVT Prophylaxis:  SCD  Plan of Care Discussed with: Supervising MD Dr. Sukumar Umaña, care team, pt, family at [facility], NP       Addendum:  Nursing called pt RA sat 85%, will stop IVF, given albumin 12.5 g every 12 hours X3 doses  Consult Pulmonary       Addendum  Spoke with Pulmonary, non concern for pneumonia, all pulmonary edema. Starting IV lasix, stopping IVF. Wean O2 as tolerated. Urine cx with e.coli. stop Vanc and Zosyn and start rocephin.

## 2020-01-09 NOTE — PROGRESS NOTES
Hourly rounds completed. All needs met. Pt c/o pain. Interventions per MAR. Pt hr elevated during the shift. Placed patient on 2L of O2 NC. Continued to encourage patient to use IS. Will give report to the oncoming day shift nurse.

## 2020-01-09 NOTE — PROGRESS NOTES
Interdisciplinary Rounds completed 01/09/2020. Nursing, Case Management, Physician and PT present. Plan of care reviewed and updated. Pulm consult.

## 2020-01-09 NOTE — ADT AUTH CERT NOTES
Utilization Reviews         Sepsis and Other Febrile Illness, without Focal Infection - Care Day 3 (1/9/2020) by Sophie Bernstein RN         Review Status Review Entered   Completed 1/9/2020 10:08       Criteria Review      Care Day: 3 Care Date: 1/9/2020 Level of Care: Inpatient Floor    Guideline Day 3    Level Of Care    (X) Floor    Clinical Status    (X) * Mental status at baseline    1/9/2020 10:08:43 EST by Limon Savesharon      no acue distress, AA&O x 3    ( ) * Fever absent or reduced    1/9/2020 10:08:43 EST by [email or web address] Saver      .7  VS: 99.3, 133, 20, 94, 91/53, 2L/NC    Activity    (X) Activity as tolerated    Routes    (X) * Oral hydration    1/9/2020 10:08:43 EST by Limon Saver      po intake; poor appetite    (X) Diet as tolerated    1/9/2020 10:08:43 EST by [email or web address] Saver      regular diet    (X) Parenteral or oral medication    1/9/2020 10:08:43 EST by [email or web address] Saver      NS 75h, Tyl 650mg o q 6h, Zofran 4mg IV x 1, Roxicodone 10mg IV x 3, Zosyn 4.5g IV q8h, Flomax 0.4mg po d    Interventions    (X) WBC    1/9/2020 10:08:43 EST by Limon Saver      wbc 9.1  rbc 3.16, H/h 9.9/29.8, plt 132, chl 111, glu 105, cr 1.49, ca 7.7  blood and urine cx NGTD  CXR: pending    Medications    (X) Possible antimicrobial treatment    (X) Possible DVT prophylaxis    * Milestone   Additional Notes   Subjective:       Patient had fever up to 102 yesterday and increased O2 requirements.   Hospitalist consulted and CXR shows mild bilateral pleural effusions likely related to all of the resuscitative fluids she is receiving.  CT A/P showed stent in the correct position, no hydro-ureteronephrosis, abscess or other abnormality to explain persistent fevers.       Cultures NGTD.  Antibiotics changed to Vanc/Zosyn for broader coverage.  Mag and K replaced.         Still having pain this AM that is controlled with medications and likely due to the stent.  Has not ambulated.  No appetite.  No flatus/BM yet. GENERAL: No acute distress, Awake, Alert, Oriented X 3   CARDIAC: regular rate and rhythm   CHEST AND LUNG: Bilateral crackles    ABDOMEN: soft, tender on L side of abdomen, non-distended, positive bowel sounds, no organomegaly, no palpable masses, no guarding, no rebound tenderness   : Bergman catheter draining cloudy maulik urine. SKIN: No rash, no erythema, no lacerations or abrasions, no ecchymosis   NEUROLOGIC: cranial nerves 2-12 grossly intact    Assessment:       Principal Problem:     Sepsis due to urinary tract infection ([address] (1/7/2020)       Active Problems:     Left ureteral stone (1/7/2020)         Sepsis ([address] (1/8/2020)           20year old female with 4 mm L proximal ureteral stone and concern for urosepsis POD 2 s/p emergent L ureteral stent placement.  Still having intermittent fevers, L flank pain this AM and bladder spasms.  Labs improving but patient still tachycardic, tachypneic and appears ill.        Plan:       -Trend labs and urine/blood cultures    -IVF decreased to 75 cc/hour    -Continue IV Vanc/Zosyn for now   -Continue bergman catheter to drainage    -Regular diet   -Pain meds PRN. -Anti-spasmodics PRN   -Appreciate hospitalist assistance with management.    -Dispo:  Anticipated discharge this weekend once patient starts to improve clinically .               Sepsis and Other Febrile Illness, without Focal Infection - Care Day 2 (1/8/2020) by Kandi Sacks, RN         Review Status Review Entered   Completed 1/8/2020 14:27       Criteria Review      Care Day: 2 Care Date: 1/8/2020 Level of Care: Inpatient Floor    Guideline Day 2    Level Of Care    (X) ICU or floor    1/8/2020 12:50:19 EST by Pascual Garcias      floor    Clinical Status    ( ) * Hemodynamic stability    1/8/2020 12:50:19 EST by Pascual Garcias      VS: 102.7, 155, 20, 98//59    (X) * Hypoxemia absent    (X) * Tachypnea absent    Activity    ( ) Activity as tolerated    1/8/2020 12:50:19 EST by Nga Cande has not ambulated since surgery    Routes    (X) Possible IV fluids    1/8/2020 12:50:19 EST by Bhavesh Ibanez      NS 100h    (X) Parenteral or oral medications    1/8/2020 12:50:19 EST by Bhavesh Ibanez      Tyl 650mg po q 6h, Rocephin 2g IV q 24h, Benadryl 25mg po x 1, Dilaudid 0.5mg IV x 1, Roxicodone 5mg po x 1, Flomax 0.4mg po d,  Vanco 1000mg IV x 1, MS04 2mg IV x 1    (X) Diet as tolerated    1/8/2020 12:50:19 EST by Bhavesh Ibanez      regular diet    Interventions    (X) WBC    1/8/2020 12:50:19 EST by Bhavesh Ibanez      wbc 14.5, rbc 3.20, H/H 10.1/30.6, plt 124, K 3.4, chl 112, glu 135, cr 1.47, ca 8, urine and bld cx pending    Medications    (X) Possible antimicrobial treatment    (X) Possible DVT prophylaxis    * Milestone   Additional Notes   01/08/20 1430    INITIAL PHYSICIAN ORDER: INPATIENT Surgical; 3. Patient receiving treatment that can only be provided in an inpatient setting (further clarification in H&P documentation) (ADMISSION ORDERS) ONE TIME     Authorizing Provider: Karla Saldivar NP    User who entered the order: Zuleika Buchanan RN       References: CLICK HERE-** FAQ-NEW CMS RULES FOR INPATIENT CERTIFICATION **   Question Answer Comment   Status: INPATIENT    Type of Bed Surgical    Inpatient Hospitalization Certified Necessary for the Following Reasons 3. Patient receiving treatment that can only be provided in an inpatient setting (further clarification in H&P documentation)    Admitting Diagnosis Sepsis [facility])    Admitting Diagnosis Left ureteral stone    Admitting Physician Linda Ross    Attending Physician Linda Ross    Estimated Length of Stay 3-4 Midnights    Discharge Plan: Home with Office Follow-up       01/08/20 1425            Urology Progress Note       Admit Date: 1/7/2020       Subjective:       Patient reports L back pain this AM and bladder spasms from catheter/stent.  States pain meds only help 1-2 hours and then wear off. T max overnight 100.  No nausea/emesis.  Has not ambulated.  Has not eaten since surgery.     GENERAL: No acute distress, Awake, Alert, Oriented X 3   CARDIAC: regular rate and rhythm   CHEST AND LUNG: Easy work of breathing, clear to auscultation bilaterally, no cyanosis   ABDOMEN: soft, tender on L side of abdomen, non-distended, positive bowel sounds, no organomegaly, no palpable masses, no guarding, no rebound tenderness   : Bergman catheter draining cloudy yellow urine. SKIN: No rash, no erythema, no lacerations or abrasions, no ecchymosis   NEUROLOGIC: cranial nerves 2-12 grossly intact            Assessment:       Active Problems:     Left ureteral stone (1/7/2020)         Sepsis due to urinary tract infection ([facility] [address] (1/7/2020)           20year old female with 4 mm L proximal ureteral stone and concern for urosepsis POD 1 s/p emergent L ureteral stent placement.  Having L flank pain this AM and bladder spasms.         Plan:       -F/U AM labs and urine/blood cultures    -Trend Cr and WBC   -Continue IV rocephin and may consider broadening if WBC not improving   -Continue bergman catheter to drainage   -Regular diet   -Will increase pain medication   -Anti-spasmodics PRN   -Dispo: Anticipated discharge Friday. Sorin Angelo M.D.       In to assess pt, HR 130s, she appears pale.  She is shivering.  Abx adjusted as per Dr. Koko Angelo.  IVF increased to 150 ml/hr.

## 2020-01-09 NOTE — CONSULTS
CONSULT NOTE    Andrekate Jackbs    1/9/2020    Date of Admission:  1/7/2020    The patient's chart is reviewed and the patient is discussed with the staff. Subjective:     Patient is a 20 y.o.  female seen and evaluated at the request of Dr. Stefano Harrington. She was admitted with flank pain and a ureteral stone. She has undergone cystoscopy with left ureteral stent placement. She had BHAVANA at the time of admission which has corrected with hydration. She is currently on Vanc and Zosyn and her urine culture is + for E Coli. She is now afebrile and her WBC has decreased. She was noted to be hypoxic this morning and is currently on 1 liter of oxygen. Abdominal CT scan done yesterday and CXR done today reveal bilateral pleural effusions and edema. Her IV fluids were stopped today. BNP is 1517. Review of Systems  A comprehensive review of systems was negative except for: Constitutional: positive for fevers, malaise and anorexia  Eyes: positive for none  Ears, nose, mouth, throat, and face: positive for none  Respiratory: positive for negative  Cardiovascular: positive for lower extremity edema  Gastrointestinal: positive for none  Genitourinary: positive for recent flank pain  Integument/breast: positive for none  Hematologic/lymphatic: positive for none  Musculoskeletal: positive for none  Neurological: positive for none  Behvioral/Psych: positive for none  Endocrine: positive for none  Allergic/Immunologic: positive for none    Patient Active Problem List   Diagnosis Code    Left ureteral stone N20.1    Sepsis due to urinary tract infection (HCC) A41.9, N39.0    Hypoxia R09.02    Bilateral pleural effusion J90       Prior to Admission Medications   Prescriptions Last Dose Informant Patient Reported? Taking? HYDROcodone-acetaminophen (NORCO) 7.5-325 mg per tablet Not Taking at Unknown time  No No   Sig: Take 1 Tab by mouth two (2) times a day for 30 days.  Max Daily Amount: 2 Tabs.   ondansetron (ZOFRAN ODT) 8 mg disintegrating tablet Not Taking at Unknown time  No No   Sig: Take 1 Tab by mouth every eight (8) hours as needed for Nausea. tamsulosin (FLOMAX) 0.4 mg capsule Not Taking at Unknown time  No No   Sig: Take 1 Cap by mouth daily for 10 days.       Facility-Administered Medications: None       Past Medical History:   Diagnosis Date    Kidney stone      Active Ambulatory Problems     Diagnosis Date Noted    No Active Ambulatory Problems     Resolved Ambulatory Problems     Diagnosis Date Noted    No Resolved Ambulatory Problems     Past Medical History:   Diagnosis Date    Kidney stone      Past Surgical History:   Procedure Laterality Date    HX CHOLECYSTECTOMY       Social History     Socioeconomic History    Marital status: SINGLE     Spouse name: Not on file    Number of children: Not on file    Years of education: Not on file    Highest education level: Not on file   Occupational History    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   Social Needs    Financial resource strain: Not on file    Food insecurity:     Worry: Not on file     Inability: Not on file    Transportation needs:     Medical: Not on file     Non-medical: Not on file   Tobacco Use    Smoking status: Never Smoker    Smokeless tobacco: Never Used   Substance and Sexual Activity    Alcohol use: Never     Frequency: Never    Drug use: Never    Sexual activity: Not on file   Lifestyle    Physical activity:     Days per week: Not on file     Minutes per session: Not on file    Stress: Not on file   Relationships    Social connections:     Talks on phone: Not on file     Gets together: Not on file     Attends Sabianism service: Not on file     Active member of club or organization: Not on file     Attends meetings of clubs or organizations: Not on file     Relationship status: Not on file    Intimate partner violence:     Fear of current or ex partner: Not on file     Emotionally abused: Not on file     Physically abused: Not on file     Forced sexual activity: Not on file   Other Topics Concern    Not on file   Social History Narrative    Lives with her parents     Family History   Problem Relation Age of Onset    No Known Problems Mother     No Known Problems Father     No Known Problems Brother      Allergies   Allergen Reactions    Vancomycin Other (comments)     Possible Red Man Syndrome on 1/8/2020 from vancomycin 1000 mg given over 1 hour. Need to administer vancomycin doses more slowly than normal to avoid reaction in the future. Note, this is NOT a true allergy, just a reaction caused by histamine release when vancomycin infused too rapidly. If it becomes an issue with subsequent doses, consider pre-treatment with benadryl before vancomycin doses.        Current Facility-Administered Medications   Medication Dose Route Frequency    albumin human 25% (BUMINATE) solution 12.5 g  12.5 g IntraVENous Q12H    furosemide (LASIX) injection 20 mg  20 mg IntraVENous Q8H    potassium chloride (K-DUR, KLOR-CON) SR tablet 20 mEq  20 mEq Oral DAILY    cefTRIAXone (ROCEPHIN) 1 g in 0.9% sodium chloride (MBP/ADV) 50 mL  1 g IntraVENous Q24H    acetaminophen (TYLENOL) tablet 650 mg  650 mg Oral Q6H    oxyCODONE IR (ROXICODONE) tablet 5-15 mg  5-15 mg Oral Q3H PRN    HYDROmorphone (PF) (DILAUDID) injection 0.5 mg  0.5 mg IntraVENous Q2H PRN    diphenhydrAMINE (BENADRYL) capsule 25 mg  25 mg Oral Q6H PRN    sodium chloride (NS) flush 10 mL  10 mL InterCATHeter Q8H    sodium chloride (NS) flush 10 mL  10 mL InterCATHeter PRN    tamsulosin (FLOMAX) capsule 0.4 mg  0.4 mg Oral DAILY    sodium chloride (NS) flush 5-40 mL  5-40 mL IntraVENous Q8H    sodium chloride (NS) flush 5-40 mL  5-40 mL IntraVENous PRN    naloxone (NARCAN) injection 0.4 mg  0.4 mg IntraVENous PRN    ondansetron (ZOFRAN) injection 4 mg  4 mg IntraVENous Q4H PRN    senna-docusate (PERICOLACE) 8.6-50 mg per tablet 1 Tab  1 Tab Oral BID PRN    hyoscyamine SL (LEVSIN/SL) tablet 0.125 mg  0.125 mg SubLINGual Q4H PRN         Objective:     Vitals:    01/09/20 1037 01/09/20 1154 01/09/20 1156 01/09/20 1210   BP: 104/68      Pulse: (!) 118  (!) 117    Resp: 18      Temp: 98.6 °F (37 °C)      SpO2: (!) 85% 93% 100% 96%   Weight:           PHYSICAL EXAM     Constitutional:  the patient is well developed and in no acute distress  HEENT:  Sclera clear, pupils equal, oral mucosa moist  Lungs: clear but decreased from the posterior with dullness bilaterally. Wearing 1 liter cannula   Cardiovascular:  RRR without M,G,R  Abd/GI: soft and non-tender; with positive bowel sounds. Ext: warm without cyanosis. There is 2+ lower leg edema. Skin:  no jaundice or rashes, no wounds   Neuro: no gross neuro deficits. Alert and oriented  Musculoskeletal: moves all four extremities. No deformities. Psychiatric: calm.  Does not appear anxious or depressed    Chest X-ray:             Recent Labs     01/09/20  0508 01/08/20  1409 01/08/20  0749 01/07/20  1217   WBC 9.1 9.3 14.5* 23.2*   HGB 9.9* 10.8* 10.1* 13.4   HCT 29.8* 32.2* 30.6* 40.3   * 142* 124* 170     Recent Labs     01/09/20  0508 01/08/20  1409 01/08/20  0541 01/07/20  1217     --  142 137   K 3.8  --  3.4* 4.1   *  --  112* 104   *  --  135* 91   CO2 22  --  23 22   BUN 14  --  18 24*   CREA 1.49*  --  1.47* 2.28*   MG 2.2 1.7*  --   --    CA 7.7*  --  8.0* 8.8   ALB  --  2.1*  --   --    SGOT  --  17  --   --          Assessment:  (Medical Decision Making)     Hospital Problems  Never Reviewed          Codes Class Noted POA    Hypoxia ICD-10-CM: R09.02  ICD-9-CM: 799.02  1/9/2020 No        Bilateral pleural effusion ICD-10-CM: J90  ICD-9-CM: 511.9  1/9/2020 No        Left ureteral stone ICD-10-CM: N20.1  ICD-9-CM: 592.1  1/7/2020 Yes        * (Principal) Sepsis due to urinary tract infection ([facility] [address] ICD-10-CM: A41.9, N39.0  ICD-9-CM: 038.9, 995.91, 599.0  1/7/2020 Yes              Plan:  (Medical Decision Making)   1. CT and CXR show bilateral pleural effusions with right > left. Will start scheduled lasix and monitor blood pressure, labs and fluid balance. Follow up CXR later this week to reassess. Can do US if needed to further assess as well. No plan to consider thoracenteses for now - monitor response to lasix. Leave bergman for accurate I:O. IV fluids have been stopped. 2. Change Cefepime to Rocephin for E Coli UTI. Can stop Vanc. No evidence of pneumonia. Repeat PCT in AM  3. Supplementing albumin  4. Monitor oxygen needs - wean to room air - maintain sat > 90%    Fidelina Bidding, NP      More than 50% of time documented was spent face-to-face contact with the patient and in the care of the patient on the floor/unit where the patient is located    Lungs: decreased sounds in the bases with dullness in bases R more than left  Heart S1 and S2 audible, no murmers or rubs appreciated  Other     Diurese for now  Consider US if does not improve  Currently able to lie yancy and on 2 LPM oxygen. I have spoken with and examined the patient. I have reviewed the history, examination, assessment, and plan and agree with the above. Mariely Mello MD      This note was signed electronically. Errors are unfortunately her likely due to dictation software.

## 2020-01-09 NOTE — PROGRESS NOTES
Urology Progress Note    Admit Date: 1/7/2020    Subjective:     Patient had fever up to 102 yesterday and increased O2 requirements. Hospitalist consulted and CXR shows mild bilateral pleural effusions likely related to all of the resuscitative fluids she is receiving. CT A/P showed stent in the correct position, no hydro-ureteronephrosis, abscess or other abnormality to explain persistent fevers. Cultures NGTD. Antibiotics changed to Vanc/Zosyn for broader coverage. Mag and K replaced. Still having pain this AM that is controlled with medications and likely due to the stent. Has not ambulated. No appetite. No flatus/BM yet. Objective:     Patient Vitals for the past 8 hrs:   BP Temp Pulse Resp SpO2 Weight   01/09/20 0651 91/53 99.3 °F (37.4 °C) (!) 133 20 94 %    01/09/20 0557      112 lb 8 oz (51 kg)   01/09/20 0402 121/57 99.3 °F (37.4 °C) (!) 121 20 93 %    01/09/20 0138   (!) 121        No intake/output data recorded. 01/07 1901 - 01/09 0700  In: -   Out: 4335 [Urine:1675]    Physical Exam:     Visit Vitals  BP 91/53 (BP 1 Location: Left arm, BP Patient Position: Head of bed elevated (Comment degrees))   Pulse (!) 133   Temp 99.3 °F (37.4 °C)   Resp 20   Wt 112 lb 8 oz (51 kg)   SpO2 94%   BMI 21.97 kg/m²        GENERAL: No acute distress, Awake, Alert, Oriented X 3  CARDIAC: regular rate and rhythm  CHEST AND LUNG: Bilateral crackles   ABDOMEN: soft, tender on L side of abdomen, non-distended, positive bowel sounds, no organomegaly, no palpable masses, no guarding, no rebound tenderness  : Bright catheter draining cloudy maulik urine.    SKIN: No rash, no erythema, no lacerations or abrasions, no ecchymosis  NEUROLOGIC: cranial nerves 2-12 grossly intact         Data Review   Recent Results (from the past 24 hour(s))   EKG, 12 LEAD, INITIAL    Collection Time: 01/08/20 12:55 PM   Result Value Ref Range    Ventricular Rate 146 BPM    Atrial Rate 146 BPM    P-R Interval 128 ms QRS Duration 76 ms    Q-T Interval 256 ms    QTC Calculation (Bezet) 398 ms    Calculated P Axis 47 degrees    Calculated R Axis 62 degrees    Calculated T Axis 7 degrees    Diagnosis       Sinus tachycardia  Low voltage QRS  Borderline ECG  No previous ECGs available  Confirmed by Sanchez Ocasio ([id number]) on 1/8/2020 1:26:34 PM     INFLUENZA A & B AG (RAPID TEST)    Collection Time: 01/08/20  1:06 PM   Result Value Ref Range    Influenza A Ag NEGATIVE  NEG      Influenza B Ag NEGATIVE  NEG      Source NASOPHARYNGEAL     LACTIC ACID    Collection Time: 01/08/20  2:09 PM   Result Value Ref Range    Lactic acid 1.8 0.4 - 2.0 MMOL/L   MAGNESIUM    Collection Time: 01/08/20  2:09 PM   Result Value Ref Range    Magnesium 1.7 (L) 1.8 - 2.4 mg/dL   CBC WITH AUTOMATED DIFF    Collection Time: 01/08/20  2:09 PM   Result Value Ref Range    WBC 9.3 4.3 - 11.1 K/uL    RBC 3.39 (L) 4.05 - 5.2 M/uL    HGB 10.8 (L) 11.7 - 15.4 g/dL    HCT 32.2 (L) 35.8 - 46.3 %    MCV 95.0 79.6 - 97.8 FL    MCH 31.9 26.1 - 32.9 PG    MCHC 33.5 31.4 - 35.0 g/dL    RDW 12.6 11.9 - 14.6 %    PLATELET 853 (L) 609 - 450 K/uL    MPV 11.0 9.4 - 12.3 FL    ABSOLUTE NRBC 0.00 0.0 - 0.2 K/uL    NEUTROPHILS 83 (H) 43 - 78 %    LYMPHOCYTES 12 (L) 13 - 44 %    MONOCYTES 3 (L) 4.0 - 12.0 %    EOSINOPHILS 0 (L) 0.5 - 7.8 %    BASOPHILS 0 0.0 - 2.0 %    IMMATURE GRANULOCYTES 2 0.0 - 5.0 %    ABS. NEUTROPHILS 7.7 1.7 - 8.2 K/UL    ABS. LYMPHOCYTES 1.1 0.5 - 4.6 K/UL    ABS. MONOCYTES 0.3 0.1 - 1.3 K/UL    ABS. EOSINOPHILS 0.0 0.0 - 0.8 K/UL    ABS. BASOPHILS 0.0 0.0 - 0.2 K/UL    ABS. IMM.  GRANS. 0.2 0.0 - 0.5 K/UL    RBC COMMENTS NORMOCYTIC/NORMOCHROMIC      WBC COMMENTS Result Confirmed By Smear      PLATELET COMMENTS ADEQUATE      DF AUTOMATED     HEPATIC FUNCTION PANEL    Collection Time: 01/08/20  2:09 PM   Result Value Ref Range    Protein, total 5.4 (L) 6.3 - 8.2 g/dL    Albumin 2.1 (L) 3.5 - 5.0 g/dL    Globulin 3.3 2.3 - 3.5 g/dL    A-G Ratio 0.6 (L) 1.2 - 3.5      Bilirubin, total 0.8 0.2 - 1.1 MG/DL    Bilirubin, direct 0.3 <0.4 MG/DL    Alk. phosphatase 82 50 - 136 U/L    AST (SGOT) 17 15 - 37 U/L    ALT (SGPT) 17 12 - 65 U/L   PROCALCITONIN    Collection Time: 01/08/20  2:09 PM   Result Value Ref Range    Procalcitonin 16.10 ng/mL   URINALYSIS W/ RFLX MICROSCOPIC    Collection Time: 01/08/20  5:45 PM   Result Value Ref Range    Color LIS      Appearance TURBID      Specific gravity 1.028 (H) 1.001 - 1.023      pH (UA) 6.0 5.0 - 9.0      Protein 100 (A) NEG mg/dL    Glucose NEGATIVE  mg/dL    Ketone NEGATIVE  NEG mg/dL    Bilirubin NEGATIVE  NEG      Blood LARGE (A) NEG      Urobilinogen 1.0 0.2 - 1.0 EU/dL    Nitrites NEGATIVE  NEG      Leukocyte Esterase SMALL (A) NEG      WBC >100 0 /hpf    RBC >100 0 /hpf    Epithelial cells 3-5 0 /hpf    Bacteria 4+ (H) 0 /hpf    Amorphous Crystals 2+ (H) 0    Yeast OCCASIONAL      Other observations RESULTS VERIFIED MANUALLY     CULTURE, URINE    Collection Time: 01/08/20  5:45 PM   Result Value Ref Range    Special Requests: GREY TUBE     Culture result:        NO GROWTH AFTER SHORT PERIOD OF INCUBATION. FURTHER RESULTS TO FOLLOW AFTER OVERNIGHT INCUBATION.    METABOLIC PANEL, BASIC    Collection Time: 01/09/20  5:08 AM   Result Value Ref Range    Sodium 140 136 - 145 mmol/L    Potassium 3.8 3.5 - 5.1 mmol/L    Chloride 111 (H) 98 - 107 mmol/L    CO2 22 21 - 32 mmol/L    Anion gap 7 7 - 16 mmol/L    Glucose 105 (H) 65 - 100 mg/dL    BUN 14 6 - 23 MG/DL    Creatinine 1.49 (H) 0.6 - 1.0 MG/DL    GFR est AA 57 (L) >60 ml/min/1.73m2    GFR est non-AA 47 (L) >60 ml/min/1.73m2    Calcium 7.7 (L) 8.3 - 10.4 MG/DL   CBC WITH AUTOMATED DIFF    Collection Time: 01/09/20  5:08 AM   Result Value Ref Range    WBC 9.1 4.3 - 11.1 K/uL    RBC 3.16 (L) 4.05 - 5.2 M/uL    HGB 9.9 (L) 11.7 - 15.4 g/dL    HCT 29.8 (L) 35.8 - 46.3 %    MCV 94.3 79.6 - 97.8 FL    MCH 31.3 26.1 - 32.9 PG    MCHC 33.2 31.4 - 35.0 g/dL    RDW 12.8 11.9 - 14.6 % PLATELET 416 (L) 979 - 450 K/uL    MPV 11.0 9.4 - 12.3 FL    ABSOLUTE NRBC 0.00 0.0 - 0.2 K/uL    DF AUTOMATED      NEUTROPHILS 81 (H) 43 - 78 %    LYMPHOCYTES 11 (L) 13 - 44 %    MONOCYTES 6 4.0 - 12.0 %    EOSINOPHILS 2 0.5 - 7.8 %    BASOPHILS 0 0.0 - 2.0 %    IMMATURE GRANULOCYTES 1 0.0 - 5.0 %    ABS. NEUTROPHILS 7.3 1.7 - 8.2 K/UL    ABS. LYMPHOCYTES 1.0 0.5 - 4.6 K/UL    ABS. MONOCYTES 0.5 0.1 - 1.3 K/UL    ABS. EOSINOPHILS 0.2 0.0 - 0.8 K/UL    ABS. BASOPHILS 0.0 0.0 - 0.2 K/UL    ABS. IMM. GRANS. 0.1 0.0 - 0.5 K/UL   MAGNESIUM    Collection Time: 01/09/20  5:08 AM   Result Value Ref Range    Magnesium 2.2 1.8 - 2.4 mg/dL           Assessment:     Principal Problem:    Sepsis due to urinary tract infection ([address] (1/7/2020)    Active Problems:    Left ureteral stone (1/7/2020)      Sepsis ([facility] [address] (1/8/2020)      20year old female with 4 mm L proximal ureteral stone and concern for urosepsis POD 2 s/p emergent L ureteral stent placement. Still having intermittent fevers, L flank pain this AM and bladder spasms. Labs improving but patient still tachycardic, tachypneic and appears ill. Plan:     -Trend labs and urine/blood cultures   -IVF decreased to 75 cc/hour   -Continue IV Vanc/Zosyn for now  -Continue bergman catheter to drainage   -Regular diet  -Pain meds PRN. -Anti-spasmodics PRN  -Appreciate hospitalist assistance with management.   -Dispo: Anticipated discharge this weekend once patient starts to improve clinically . Sorin Briceno M.D.     [facility]  Mg  [address] [place] [address], [address]  Phone: [phone]  Fax: [phone]

## 2020-01-09 NOTE — PROGRESS NOTES
Attempted to obtain abg. Family members would like the procedure not to be done at this time. Rn notified.

## 2020-01-09 NOTE — PROGRESS NOTES
Hourly rounds completed during this shift. All needs met at this time. Will continue to monitor and give report to oncoming RN.

## 2020-01-10 LAB
ALBUMIN SERPL-MCNC: 2.3 G/DL (ref 3.5–5)
ALBUMIN/GLOB SERPL: 0.7 {RATIO} (ref 1.2–3.5)
ALP SERPL-CCNC: 93 U/L (ref 50–136)
ALT SERPL-CCNC: 31 U/L (ref 12–65)
ANION GAP SERPL CALC-SCNC: 10 MMOL/L (ref 7–16)
AST SERPL-CCNC: 29 U/L (ref 15–37)
ATRIAL RATE: 139 BPM
BASOPHILS # BLD: 0 K/UL (ref 0–0.2)
BASOPHILS NFR BLD: 0 % (ref 0–2)
BILIRUB SERPL-MCNC: 1.7 MG/DL (ref 0.2–1.1)
BUN SERPL-MCNC: 14 MG/DL (ref 6–23)
CALCIUM SERPL-MCNC: 8.7 MG/DL (ref 8.3–10.4)
CALCULATED P AXIS, ECG09: 55 DEGREES
CALCULATED R AXIS, ECG10: 54 DEGREES
CALCULATED T AXIS, ECG11: -6 DEGREES
CHLORIDE SERPL-SCNC: 107 MMOL/L (ref 98–107)
CO2 SERPL-SCNC: 23 MMOL/L (ref 21–32)
CREAT SERPL-MCNC: 1.62 MG/DL (ref 0.6–1)
DIAGNOSIS, 93000: NORMAL
DIFFERENTIAL METHOD BLD: ABNORMAL
EOSINOPHIL # BLD: 0.1 K/UL (ref 0–0.8)
EOSINOPHIL NFR BLD: 1 % (ref 0.5–7.8)
ERYTHROCYTE [DISTWIDTH] IN BLOOD BY AUTOMATED COUNT: 12.7 % (ref 11.9–14.6)
GLOBULIN SER CALC-MCNC: 3.4 G/DL (ref 2.3–3.5)
GLUCOSE SERPL-MCNC: 84 MG/DL (ref 65–100)
HCT VFR BLD AUTO: 34.3 % (ref 35.8–46.3)
HGB BLD-MCNC: 11.5 G/DL (ref 11.7–15.4)
IMM GRANULOCYTES # BLD AUTO: 0.1 K/UL (ref 0–0.5)
IMM GRANULOCYTES NFR BLD AUTO: 1 % (ref 0–5)
LYMPHOCYTES # BLD: 1 K/UL (ref 0.5–4.6)
LYMPHOCYTES NFR BLD: 10 % (ref 13–44)
MCH RBC QN AUTO: 31 PG (ref 26.1–32.9)
MCHC RBC AUTO-ENTMCNC: 33.5 G/DL (ref 31.4–35)
MCV RBC AUTO: 92.5 FL (ref 79.6–97.8)
MONOCYTES # BLD: 0.6 K/UL (ref 0.1–1.3)
MONOCYTES NFR BLD: 6 % (ref 4–12)
NEUTS SEG # BLD: 7.9 K/UL (ref 1.7–8.2)
NEUTS SEG NFR BLD: 81 % (ref 43–78)
NRBC # BLD: 0 K/UL (ref 0–0.2)
P-R INTERVAL, ECG05: 126 MS
PLATELET # BLD AUTO: 145 K/UL (ref 150–450)
PMV BLD AUTO: 10.6 FL (ref 9.4–12.3)
POTASSIUM SERPL-SCNC: 3.7 MMOL/L (ref 3.5–5.1)
PROCALCITONIN SERPL-MCNC: 197.71 NG/ML
PROT SERPL-MCNC: 5.7 G/DL (ref 6.3–8.2)
Q-T INTERVAL, ECG07: 294 MS
QRS DURATION, ECG06: 90 MS
QTC CALCULATION (BEZET), ECG08: 447 MS
RBC # BLD AUTO: 3.71 M/UL (ref 4.05–5.2)
SODIUM SERPL-SCNC: 140 MMOL/L (ref 136–145)
VENTRICULAR RATE, ECG03: 139 BPM
WBC # BLD AUTO: 9.8 K/UL (ref 4.3–11.1)

## 2020-01-10 PROCEDURE — 74011250637 HC RX REV CODE- 250/637: Performed by: NURSE PRACTITIONER

## 2020-01-10 PROCEDURE — 93005 ELECTROCARDIOGRAM TRACING: CPT | Performed by: INTERNAL MEDICINE

## 2020-01-10 PROCEDURE — 94760 N-INVAS EAR/PLS OXIMETRY 1: CPT

## 2020-01-10 PROCEDURE — 36415 COLL VENOUS BLD VENIPUNCTURE: CPT

## 2020-01-10 PROCEDURE — 74011250637 HC RX REV CODE- 250/637: Performed by: INTERNAL MEDICINE

## 2020-01-10 PROCEDURE — P9047 ALBUMIN (HUMAN), 25%, 50ML: HCPCS | Performed by: NURSE PRACTITIONER

## 2020-01-10 PROCEDURE — 74011250636 HC RX REV CODE- 250/636: Performed by: INTERNAL MEDICINE

## 2020-01-10 PROCEDURE — 74011250636 HC RX REV CODE- 250/636: Performed by: NURSE PRACTITIONER

## 2020-01-10 PROCEDURE — 74011000258 HC RX REV CODE- 258: Performed by: NURSE PRACTITIONER

## 2020-01-10 PROCEDURE — 85025 COMPLETE CBC W/AUTO DIFF WBC: CPT

## 2020-01-10 PROCEDURE — 84145 PROCALCITONIN (PCT): CPT

## 2020-01-10 PROCEDURE — 74011250637 HC RX REV CODE- 250/637: Performed by: UROLOGY

## 2020-01-10 PROCEDURE — 65660000000 HC RM CCU STEPDOWN

## 2020-01-10 PROCEDURE — 74011250636 HC RX REV CODE- 250/636: Performed by: UROLOGY

## 2020-01-10 PROCEDURE — 77010033678 HC OXYGEN DAILY

## 2020-01-10 PROCEDURE — 80053 COMPREHEN METABOLIC PANEL: CPT

## 2020-01-10 PROCEDURE — 99232 SBSQ HOSP IP/OBS MODERATE 35: CPT | Performed by: INTERNAL MEDICINE

## 2020-01-10 RX ORDER — ACETAMINOPHEN 325 MG/1
650 TABLET ORAL
Status: DISCONTINUED | OUTPATIENT
Start: 2020-01-10 | End: 2020-01-13 | Stop reason: HOSPADM

## 2020-01-10 RX ORDER — FLUCONAZOLE 100 MG/1
200 TABLET ORAL ONCE
Status: COMPLETED | OUTPATIENT
Start: 2020-01-10 | End: 2020-01-10

## 2020-01-10 RX ORDER — PROMETHAZINE HYDROCHLORIDE 25 MG/1
25 TABLET ORAL
Status: DISCONTINUED | OUTPATIENT
Start: 2020-01-10 | End: 2020-01-13 | Stop reason: HOSPADM

## 2020-01-10 RX ORDER — CIPROFLOXACIN 2 MG/ML
400 INJECTION, SOLUTION INTRAVENOUS EVERY 12 HOURS
Status: DISCONTINUED | OUTPATIENT
Start: 2020-01-10 | End: 2020-01-13 | Stop reason: HOSPADM

## 2020-01-10 RX ADMIN — HYDROMORPHONE HYDROCHLORIDE 0.5 MG: 1 INJECTION, SOLUTION INTRAMUSCULAR; INTRAVENOUS; SUBCUTANEOUS at 17:00

## 2020-01-10 RX ADMIN — CIPROFLOXACIN 400 MG: 2 INJECTION, SOLUTION INTRAVENOUS at 15:53

## 2020-01-10 RX ADMIN — ACETAMINOPHEN 650 MG: 325 TABLET, FILM COATED ORAL at 05:07

## 2020-01-10 RX ADMIN — Medication 5 ML: at 13:20

## 2020-01-10 RX ADMIN — FLUCONAZOLE 200 MG: 100 TABLET ORAL at 13:07

## 2020-01-10 RX ADMIN — Medication 10 ML: at 14:00

## 2020-01-10 RX ADMIN — ONDANSETRON 4 MG: 2 INJECTION INTRAMUSCULAR; INTRAVENOUS at 09:01

## 2020-01-10 RX ADMIN — Medication 10 ML: at 22:03

## 2020-01-10 RX ADMIN — Medication 5 ML: at 15:53

## 2020-01-10 RX ADMIN — ACETAMINOPHEN 650 MG: 325 TABLET, FILM COATED ORAL at 18:05

## 2020-01-10 RX ADMIN — POTASSIUM CHLORIDE 20 MEQ: 20 TABLET, EXTENDED RELEASE ORAL at 09:01

## 2020-01-10 RX ADMIN — PROMETHAZINE HYDROCHLORIDE 25 MG: 25 TABLET ORAL at 11:24

## 2020-01-10 RX ADMIN — ACETAMINOPHEN 650 MG: 325 TABLET, FILM COATED ORAL at 13:07

## 2020-01-10 RX ADMIN — OXYCODONE HYDROCHLORIDE 10 MG: 5 TABLET ORAL at 04:33

## 2020-01-10 RX ADMIN — CEFTRIAXONE 2 G: 2 INJECTION, POWDER, FOR SOLUTION INTRAMUSCULAR; INTRAVENOUS at 13:07

## 2020-01-10 RX ADMIN — ACETAMINOPHEN 650 MG: 325 TABLET, FILM COATED ORAL at 11:24

## 2020-01-10 RX ADMIN — Medication 10 ML: at 05:08

## 2020-01-10 RX ADMIN — PROMETHAZINE HYDROCHLORIDE 25 MG: 25 TABLET ORAL at 22:40

## 2020-01-10 RX ADMIN — TAMSULOSIN HYDROCHLORIDE 0.4 MG: 0.4 CAPSULE ORAL at 09:01

## 2020-01-10 RX ADMIN — ALBUMIN (HUMAN) 12.5 G: 0.25 INJECTION, SOLUTION INTRAVENOUS at 09:01

## 2020-01-10 RX ADMIN — ACETAMINOPHEN 650 MG: 325 TABLET, FILM COATED ORAL at 23:11

## 2020-01-10 RX ADMIN — FUROSEMIDE 20 MG: 10 INJECTION, SOLUTION INTRAMUSCULAR; INTRAVENOUS at 05:07

## 2020-01-10 RX ADMIN — HYDROMORPHONE HYDROCHLORIDE 0.5 MG: 1 INJECTION, SOLUTION INTRAMUSCULAR; INTRAVENOUS; SUBCUTANEOUS at 10:00

## 2020-01-10 NOTE — PROGRESS NOTES
Pt febrile with sustained HR high 140's. EKG being performed at this time. Temp down to 101.1 at this time after tylenol. Pt has had a small amount of brown vaginal foul smelling discharge this shift. Urologist made aware. Good urine output this shift. Urine is yellow, no odor. Pt with emesis earlier in this shift. Doctor made aware and pt given phenergan which was effective. Pt able to tolerate FF this morning without nausea. Pt  states pt has panic attacks and has been very stressed. Hourly rounds performed;all pt needs met. Bed in low, locked position and call light/ personal items within reach. Will continue to monitor and give bedside shift report to oncoming day shift nurse.

## 2020-01-10 NOTE — CONSULTS
Infectious Disease Consult    Today's Date: 1/10/2020   Admit Date: 1/7/2020    Impression:   · Persistent fever in setting of bilateral pleural effusions, L>R and E coli pyelonephritis, with partially obstructing L kidney stone, s/p ureteral stent placement 1/7    Plan:      · Change ceftriaxone to cipro; will treat for 14 days total   · Check HIV screen and RNA PCR with concern for possible acute viral syndrome    Anti-infectives:   · Ceftriaxone (1/7-   · Fluconazole x 1 (1/10)  · Vancomycin (1/8) - concern for \"red man's syndrome\" vs allergy during this infusion  · Zosyn (1/8-1/9)    Subjective:   Date of Consultation:  January 10, 2020  Referring Physician: Karo Acuna NP, Urology    Patient is a 20 y.o. female without significant past medical history who presented to the ED with L flank pain on 1/5/2020 and had cystoscopy with L ureteral stent placement due to stone. On 1/7/2020 she was febrile to 101.8, with leukocytosis 23k, creatinine 2.28 on admission, and urine culture 1/7/2020 has grown E coli, intermediate to cefuroxime. She has remained febrile, and blood cultures 1/7/2020 are NGTD. CT abd pelvis 1/8/2020 shows partially obstructing stone and bilateral pleural effusions. CXR 1/9/2020 shows worsening of the effusions and L base opacity. She was complaining of thrush and has received one dose of fluconazole. She has been on antibiotics as above, and ID has been asked for further recommendations.      Patient Active Problem List   Diagnosis Code    Left ureteral stone N20.1    Sepsis due to urinary tract infection ([address] A41.9, N39.0    Hypoxia R09.02    Bilateral pleural effusion J90     Past Medical History:   Diagnosis Date    Kidney stone       Family History   Problem Relation Age of Onset    No Known Problems Mother     No Known Problems Father     No Known Problems Brother       Social History     Tobacco Use    Smoking status: Never Smoker    Smokeless tobacco: Never Used   Substance Use Topics  Alcohol use: Never     Frequency: Never     Past Surgical History:   Procedure Laterality Date    HX CHOLECYSTECTOMY        Prior to Admission medications    Medication Sig Start Date End Date Taking? Authorizing Provider   tamsulosin (FLOMAX) 0.4 mg capsule Take 1 Cap by mouth daily for 10 days. 20  Crow José MD   HYDROcodone-acetaminophen [facility]) 7.5-325 mg per tablet Take 1 Tab by mouth two (2) times a day for 30 days. Max Daily Amount: 2 Tabs. 20  Crow José MD   ondansetron (ZOFRAN ODT) 8 mg disintegrating tablet Take 1 Tab by mouth every eight (8) hours as needed for Nausea. 20   Crow José MD       Allergies   Allergen Reactions    Vancomycin Other (comments)     Possible Red Man Syndrome on 2020 from vancomycin 1000 mg given over 1 hour. Need to administer vancomycin doses more slowly than normal to avoid reaction in the future. Note, this is NOT a true allergy, just a reaction caused by histamine release when vancomycin infused too rapidly. If it becomes an issue with subsequent doses, consider pre-treatment with benadryl before vancomycin doses. Review of Systems:  A comprehensive review of systems was negative except for that written in the History of Present Illness.     Objective:     Visit Vitals  /64 (BP 1 Location: Left arm, BP Patient Position: At rest)   Pulse (!) 122   Temp (!) 101.1 °F (38.4 °C)   Resp 20   Wt 51 kg (112 lb 8 oz)   SpO2 98%   BMI 21.97 kg/m²     Temp (24hrs), Av.4 °F (37.4 °C), Min:98.4 °F (36.9 °C), Max:101.1 °F (38.4 °C)       Lines:  Peripheral IV:       Physical Exam:    General:  Drowsy, cooperative, no acute distress but appears very uncomfortable, appears stated age   Eyes:  Anicteric sclerae   Mouth/Throat: Mucous membranes moist, small lip blisters   Neck: Supple, midline trachea   Lungs:   Poor air movement throughout lung fields, dyspneic, O2 via NC   CV:  Regular rate and rhythm,no murmur, click, rub or gallop   Abdomen:   Soft, non tender, active bowel sounds   Extremities: No cyanosis, trace pedal edema   Skin: Skin color, texture, turgor without acute rash; moist and warm   Musculoskeletal: No swelling or deformity   Lines/Devices:   Bright catheter with clear yellow urine   Psych: oriented, appropriate mood and affect given the setting       Data Review:     CBC:  Recent Labs     01/10/20  0545 01/09/20  0508 01/08/20  1409   WBC 9.8 9.1 9.3   GRANS 81* 81* 83*   MONOS 6 6 3*   EOS 1 2 0*   ANEU 7.9 7.3 7.7   ABL 1.0 1.0 1.1   HGB 11.5* 9.9* 10.8*   HCT 34.3* 29.8* 32.2*   * 132* 142*       BMP:  Recent Labs     01/10/20  0400 01/09/20  0508 01/08/20  0541   CREA 1.62* 1.49* 1.47*   BUN 14 14 18    140 142   K 3.7 3.8 3.4*    111* 112*   CO2 23 22 23   AGAP 10 7 7   GLU 84 105* 135*       LFTS:  Recent Labs     01/10/20  0400 01/08/20  1409   TBILI 1.7* 0.8   ALT 31 17   SGOT 29 17   AP 93 82   TP 5.7* 5.4*   ALB 2.3* 2.1*       Microbiology:     All Micro Results     Procedure Component Value Units Date/Time    CULTURE, BLOOD [[id number]] Collected:  01/07/20 1242    Order Status:  Completed Specimen:  Blood Updated:  01/10/20 0952     Special Requests: --        RIGHT  Antecubital       Culture result: NO GROWTH 3 DAYS       CULTURE, BLOOD [[id number]] Collected:  01/07/20 1249    Order Status:  Completed Specimen:  Blood Updated:  01/10/20 0952     Special Requests: --        LEFT  Antecubital       Culture result: NO GROWTH 3 DAYS       CULTURE, URINE [[id number]] Collected:  01/08/20 1847    Order Status:  Completed Specimen:  Urine from Bright Specimen Updated:  01/10/20 8096     Special Requests: GREY TUBE     Culture result: NO GROWTH 1 DAY       CULTURE, URINE [[id number]] Collected:  01/07/20 1307    Order Status:  Completed Specimen:  Cystoscopic Urine Updated:  01/09/20 0808     Special Requests: NO SPECIAL REQUESTS        Culture result: NO GROWTH 2 DAYS       INFLUENZA A & B AG (RAPID TEST) [[id number]] Collected:  20 1306    Order Status:  Completed Specimen:  Nasopharyngeal from Nasal washing Updated:  20 1334     Influenza A Ag NEGATIVE         Comment: NEGATIVE FOR THE PRESENCE OF INFLUENZA A ANTIGEN  INFECTION DUE TO INFLUENZA A CANNOT BE RULED OUT. BECAUSE THE ANTIGEN PRESENT IN THE SAMPLE MAY BE BELOW  THE DETECTION LIMIT OF THE TEST. A NEGATIVE TEST IS PRESUMPTIVE AND IT IS RECOMMENDED THAT THESE RESULTS BE CONFIRMED BY VIRAL CULTURE OR AN FDA-CLEARED INFLUENZA A AND B MOLECULAR ASSAY. Influenza B Ag NEGATIVE         Comment: NEGATIVE FOR THE PRESENCE OF INFLUENZA B ANTIGEN  INFECTION DUE TO INFLUENZA B CANNOT BE RULED OUT. BECAUSE THE ANTIGEN PRESENT IN THE SAMPLE MAY BE BELOW  THE DETECTION LIMIT OF THE TEST. A NEGATIVE TEST IS PRESUMPTIVE AND IT IS RECOMMENDED THAT THESE RESULTS BE CONFIRMED BY VIRAL CULTURE OR AN FDA-CLEARED INFLUENZA A AND B MOLECULAR ASSAY. Source NASOPHARYNGEAL       CULTURE, BLOOD [[id number]]     Order Status:  Canceled Specimen:  Blood     CULTURE, BLOOD [[id number]]     Order Status:  Canceled Specimen:  Blood     CULTURE, URINE [[id number]]     Order Status:  Canceled Specimen:  Cath Urine           Imagin2020 CXR  IMPRESSION:  1. Worsening retrocardiac opacity at the left lung base. This may be atelectasis  or infection. 2. Pulmonary edema and small left greater than right pleural effusions have  worsened as well. 2020 CT Abd pelv wo cont  1. Interval placement of a left double pigtail ureteral stent which appears in  anatomic position. The small partially obstructing calculus in the left UPJ  appears relatively unchanged since prior. 2.  New small bilateral pleural effusions with probable acute mild pulmonary  edema. 3.  Other chronic findings as above.     Signed By: Marcellus Rosales NP     January 10, 2020

## 2020-01-10 NOTE — PROGRESS NOTES
Chart screened by  for discharge planning. Patient remains having fevers, patient was hypokalemic and fluids were stopped. Patient sepsis slowly to respond. Patient will likely discharge home when medically stable. CM will continue to follow patient during hospitalization for discharge planning and needs. No needs identified at this time. Please consult  if any new issues arise.

## 2020-01-10 NOTE — PROGRESS NOTES
01/10/20 0505   Vital Signs   Temp (!) 101.1 °F (38.4 °C)   Temp Source Oral   Pulse (Heart Rate) (!) 148   Heart Rate Source Monitor   Resp Rate 22   O2 Sat (%) 94 %   Level of Consciousness Alert   /69   MAP (Calculated) 82   BP 1 Method Automatic   BP 1 Location Left arm   BP Patient Position At rest   Pt febrile. Notified doctor pt HR sustaining high 140's-150. Given tylenol and cool cloth for head. Will continue to monitor.

## 2020-01-10 NOTE — PROGRESS NOTES
Hospitalist Progress Note     Admit Date:  2020 11:51 AM   [facility]   Age:  20 y.o.  :  2000   MRN:  [id number]   PCP:  Kelsie Pepe MD  Treatment Team: Attending Provider: Shaye Ortega MD; Care Manager: Fuad Hurt, RN; Utilization Review: Leo Rodriguez RN; Consulting Provider: Clyde Fernandes MD; Consulting Provider: Mariann Short MD; Staff Nurse: Rudolph Sanchez; Consulting Provider: Dean Do MD    Subjective:   HPI and or CC:  Summary and HPI from prior note:  Ms. PATIENTS FERNANDO ASHBY Enterprise is a 21 yo female with no significant PMH who was admitted by Urology and underwent a cystoscopy, left ureteral stent placement 20.  Was found to be in BHAVANA and creatinine has since trended down.  Hospitalist consulted for concern for sepsis 20 given persistent tachycardia and fever.  Pt temp 102.7 after tylenol, tachycardic at 155.  Pt was on rocephin and Vanc was added by Urology, pt did have Red Man Syndrome reaction to Vanc.  Also hypomagnesemic and hypokalemic, replaced and resolved. UA consistent with UTI. BC NGTD. Lactic acid 1.8, PCT 16. CXR showed concern for pneumonia. 20 CT abd/pelvis without contrast showed early pulmonary edema, ureteral stent in position. 20 Rocephin stopped and Zosyn added to Vanc. IVF decreased to 75 ml/hr from 150 ml/hr.  Repeat CXR shows worsening retrocardiac opacity left lung base atelectasis vs infection and pulmonary edema, small left greater than right pleural effusions worsened as well. She was placed on oxygen last night with normal O2 sats per nursing for \"comfort\". Today she went to xray off O2, came back with sats 85% on RA. O2 placed back on pt. Had midline placed 20. She denies cough, CP, SOB, abd pain other than her original left sided pain she presented with, n/v/d. She appears acutely ill however does appear improved from yesterday.       January 10, 2020:  Continues to have intermittent high fevers. Infectious disease has followed up and HIV screen regarding possible acute viral syndrome in addition to bacterial infection. Please see notes. I spoke with nutrition consultant and does not feel that patient is yet ready for commitment to TPN. Slowly improving. Hopefully can get by with oral supplementation. Echocardiogram with preserved LV function possible abnormality and IVC collapse with sniff but nonspecific. And could be transient/due to acute pulmonary infectious process. From ID notes:  · Ceftriaxone (1/7- changed to quinolone today January 10 and 2-week therapy planned. · Fluconazole x 1 (1/10)  · Vancomycin (1/8) - concern for \"red man's syndrome\" vs allergy during this infusion  · Zosyn (1/8-1/9)    Objective:     Patient Vitals for the past 24 hrs:   Temp Pulse Resp BP SpO2   01/10/20 1512 98.4 °F (36.9 °C) (!) 111 18 106/62 97 %   01/10/20 1409 98.4 °F (36.9 °C)       01/10/20 1214 (!) 101.1 °F (38.4 °C) (!) 122 20 113/64 98 %   01/10/20 0830     97 %   01/10/20 0702 98.5 °F (36.9 °C) (!) 125 20 114/51 92 %   01/10/20 0601 100.1 °F (37.8 °C) (!) 147      01/10/20 0540  (!) 147      01/10/20 0505 (!) 101.1 °F (38.4 °C) (!) 148 22 109/69 94 %   01/10/20 0419  (!) 113      01/10/20 0411 98.6 °F (37 °C) (!) 133 24 122/62 92 %   01/10/20 0016 99.1 °F (37.3 °C) (!) 127 18 115/61 92 %   01/09/20 1943 98.4 °F (36.9 °C) (!) 112 20 109/67 99 %   01/09/20 1938     93 %     Oxygen Therapy  O2 Sat (%): 97 % (01/10/20 1512)  Pulse via Oximetry: 105 beats per minute (01/10/20 0830)  O2 Device: Nasal cannula (01/10/20 0830)  O2 Flow Rate (L/min): 2 l/min (01/10/20 0830)    Intake/Output Summary (Last 24 hours) at 1/10/2020 1622  Last data filed at 1/10/2020 1443  Gross per 24 hour   Intake 120 ml   Output 5250 ml   Net -5130 ml         REVIEW OF SYSTEMS: Comprehensive ROS performed and negative except as stated in HPI.     Physical Examination:  General:    Lethargic but arousable  Head:  Normocephalic, atraumatic, nares patent  Eyes:  Extraocular movements intact, normal sclera  CV:   Tachycardic but regular no  Murmurs, clicks, or gallops, distal pulses intact  Lungs:   Unlabored, no cyanosis, no wheeze  Abdomen:   Soft, nondistended, nontender. Extremities: Warm and dry. No cyanosis or edema. Skin:     No rashes or jaundice. Neuro:  No gross focal deficits, no tremor      Data Review:  I have reviewed all labs, meds, telemetry events, and studies from the last 24 hours. Recent Results (from the past 24 hour(s))   PROCALCITONIN    Collection Time: 01/10/20  4:00 AM   Result Value Ref Range    Procalcitonin 898.79 ng/mL   METABOLIC PANEL, COMPREHENSIVE    Collection Time: 01/10/20  4:00 AM   Result Value Ref Range    Sodium 140 136 - 145 mmol/L    Potassium 3.7 3.5 - 5.1 mmol/L    Chloride 107 98 - 107 mmol/L    CO2 23 21 - 32 mmol/L    Anion gap 10 7 - 16 mmol/L    Glucose 84 65 - 100 mg/dL    BUN 14 6 - 23 MG/DL    Creatinine 1.62 (H) 0.6 - 1.0 MG/DL    GFR est AA 52 (L) >60 ml/min/1.73m2    GFR est non-AA 43 (L) >60 ml/min/1.73m2    Calcium 8.7 8.3 - 10.4 MG/DL    Bilirubin, total 1.7 (H) 0.2 - 1.1 MG/DL    ALT (SGPT) 31 12 - 65 U/L    AST (SGOT) 29 15 - 37 U/L    Alk.  phosphatase 93 50 - 136 U/L    Protein, total 5.7 (L) 6.3 - 8.2 g/dL    Albumin 2.3 (L) 3.5 - 5.0 g/dL    Globulin 3.4 2.3 - 3.5 g/dL    A-G Ratio 0.7 (L) 1.2 - 3.5     CBC WITH AUTOMATED DIFF    Collection Time: 01/10/20  5:45 AM   Result Value Ref Range    WBC 9.8 4.3 - 11.1 K/uL    RBC 3.71 (L) 4.05 - 5.2 M/uL    HGB 11.5 (L) 11.7 - 15.4 g/dL    HCT 34.3 (L) 35.8 - 46.3 %    MCV 92.5 79.6 - 97.8 FL    MCH 31.0 26.1 - 32.9 PG    MCHC 33.5 31.4 - 35.0 g/dL    RDW 12.7 11.9 - 14.6 %    PLATELET 946 (L) 724 - 450 K/uL    MPV 10.6 9.4 - 12.3 FL    ABSOLUTE NRBC 0.00 0.0 - 0.2 K/uL    DF AUTOMATED      NEUTROPHILS 81 (H) 43 - 78 %    LYMPHOCYTES 10 (L) 13 - 44 %    MONOCYTES 6 4.0 - 12.0 %    EOSINOPHILS 1 0.5 - 7.8 %    BASOPHILS 0 0.0 - 2.0 %    IMMATURE GRANULOCYTES 1 0.0 - 5.0 %    ABS. NEUTROPHILS 7.9 1.7 - 8.2 K/UL    ABS. LYMPHOCYTES 1.0 0.5 - 4.6 K/UL    ABS. MONOCYTES 0.6 0.1 - 1.3 K/UL    ABS. EOSINOPHILS 0.1 0.0 - 0.8 K/UL    ABS. BASOPHILS 0.0 0.0 - 0.2 K/UL    ABS. IMM.  GRANS. 0.1 0.0 - 0.5 K/UL   EKG, 12 LEAD, INITIAL    Collection Time: 01/10/20  6:14 AM   Result Value Ref Range    Ventricular Rate 139 BPM    Atrial Rate 139 BPM    P-R Interval 126 ms    QRS Duration 90 ms    Q-T Interval 294 ms    QTC Calculation (Bezet) 447 ms    Calculated P Axis 55 degrees    Calculated R Axis 54 degrees    Calculated T Axis -6 degrees    Diagnosis       Sinus tachycardia  Nonspecific T wave abnormality    When compared with ECG of 08-JAN-2020 12:55,  No significant change was found  Confirmed by ST KEYSHA BOWENS MD (), BABATUNDE ARELLANO ([id number]) on 1/10/2020 7:07:03 AM          All Micro Results     Procedure Component Value Units Date/Time    CULTURE, BLOOD [[id number]] Collected:  01/07/20 1242    Order Status:  Completed Specimen:  Blood Updated:  01/10/20 0952     Special Requests: --        RIGHT  Antecubital       Culture result: NO GROWTH 3 DAYS       CULTURE, BLOOD [[id number]] Collected:  01/07/20 1249    Order Status:  Completed Specimen:  Blood Updated:  01/10/20 0952     Special Requests: --        LEFT  Antecubital       Culture result: NO GROWTH 3 DAYS       CULTURE, URINE [[id number]] Collected:  01/08/20 9883    Order Status:  Completed Specimen:  Urine from Bright Specimen Updated:  01/10/20 0758     Special Requests: GREY TUBE     Culture result: NO GROWTH 1 DAY       CULTURE, URINE [[id number]] Collected:  01/07/20 1307    Order Status:  Completed Specimen:  Cystoscopic Urine Updated:  01/09/20 0808     Special Requests: NO SPECIAL REQUESTS        Culture result: NO GROWTH 2 DAYS       INFLUENZA A & B AG (RAPID TEST) [[id number]] Collected:  01/08/20 1306    Order Status:  Completed Specimen:  Nasopharyngeal from Nasal washing Updated:  01/08/20 1334     Influenza A Ag NEGATIVE         Comment: NEGATIVE FOR THE PRESENCE OF INFLUENZA A ANTIGEN  INFECTION DUE TO INFLUENZA A CANNOT BE RULED OUT. BECAUSE THE ANTIGEN PRESENT IN THE SAMPLE MAY BE BELOW  THE DETECTION LIMIT OF THE TEST. A NEGATIVE TEST IS PRESUMPTIVE AND IT IS RECOMMENDED THAT THESE RESULTS BE CONFIRMED BY VIRAL CULTURE OR AN FDA-CLEARED INFLUENZA A AND B MOLECULAR ASSAY. Influenza B Ag NEGATIVE         Comment: NEGATIVE FOR THE PRESENCE OF INFLUENZA B ANTIGEN  INFECTION DUE TO INFLUENZA B CANNOT BE RULED OUT. BECAUSE THE ANTIGEN PRESENT IN THE SAMPLE MAY BE BELOW  THE DETECTION LIMIT OF THE TEST. A NEGATIVE TEST IS PRESUMPTIVE AND IT IS RECOMMENDED THAT THESE RESULTS BE CONFIRMED BY VIRAL CULTURE OR AN FDA-CLEARED INFLUENZA A AND B MOLECULAR ASSAY.           Source NASOPHARYNGEAL       CULTURE, BLOOD [[id number]]     Order Status:  Canceled Specimen:  Blood     CULTURE, BLOOD [[id number]]     Order Status:  Canceled Specimen:  Blood     CULTURE, URINE [[id number]]     Order Status:  Canceled Specimen:  Cath Urine           Current Meds:  Current Facility-Administered Medications   Medication Dose Route Frequency    promethazine (PHENERGAN) tablet 25 mg  25 mg Oral Q6H PRN    magic mouthwash (CRISELDA) oral suspension 5 mL  5 mL Swish and Spit Q4H    acetaminophen (TYLENOL) tablet 650 mg  650 mg Oral Q6H PRN    ciprofloxacin (CIPRO) 400 mg in D5W IVPB (premix)  400 mg IntraVENous Q12H    potassium chloride (K-DUR, KLOR-CON) SR tablet 20 mEq  20 mEq Oral DAILY    acetaminophen (TYLENOL) tablet 650 mg  650 mg Oral Q6H    oxyCODONE IR (ROXICODONE) tablet 5-15 mg  5-15 mg Oral Q3H PRN    HYDROmorphone (PF) (DILAUDID) injection 0.5 mg  0.5 mg IntraVENous Q2H PRN    diphenhydrAMINE (BENADRYL) capsule 25 mg  25 mg Oral Q6H PRN    sodium chloride (NS) flush 10 mL  10 mL InterCATHeter Q8H    sodium chloride (NS) flush 10 mL  10 mL InterCATHeter PRN    tamsulosin (FLOMAX) capsule 0.4 mg  0.4 mg Oral DAILY    sodium chloride (NS) flush 5-40 mL  5-40 mL IntraVENous Q8H    sodium chloride (NS) flush 5-40 mL  5-40 mL IntraVENous PRN    naloxone (NARCAN) injection 0.4 mg  0.4 mg IntraVENous PRN    ondansetron (ZOFRAN) injection 4 mg  4 mg IntraVENous Q4H PRN    senna-docusate (PERICOLACE) 8.6-50 mg per tablet 1 Tab  1 Tab Oral BID PRN    hyoscyamine SL (LEVSIN/SL) tablet 0.125 mg  0.125 mg SubLINGual Q4H PRN       Diet:  DIET REGULAR  DIET NUTRITIONAL SUPPLEMENTS  DIET NUTRITIONAL SUPPLEMENTS  DIET NUTRITIONAL SUPPLEMENTS    Other Studies (last 24 hours):  No results found. Assessment and Plan:     Hospital Problems as of 1/10/2020 Never Reviewed          Codes Class Noted - Resolved POA    Hypoxia ICD-10-CM: R09.02  ICD-9-CM: 799.02  1/9/2020 - Present No        Bilateral pleural effusion ICD-10-CM: J90  ICD-9-CM: 511.9  1/9/2020 - Present No        Left ureteral stone ICD-10-CM: N20.1  ICD-9-CM: 592.1  1/7/2020 - Present Yes        * (Principal) Sepsis due to urinary tract infection ([facility] [address] ICD-10-CM: A41.9, N39.0  ICD-9-CM: 038.9, 995.91, 599.0  1/7/2020 - Present Yes              A/P:    Left ureteral stone  Urology primary--postop day #3 status post left ureteral stent  S/p cysto 1/7/20        Sepsis  Slow to improve.     BHAVANA  Creatinine improved but increased today from 1.491.62follow     Hypokalemia  Continue to follow. Now off IV fluid.     Hypomagnesemic  Replaced, resolvedrecheck if hypokalemia recurs.     Tachycardia  Sinus tach by EKG and monitoring.     Pulmonary edema/acute resp failure with hypoxia  Appreciate pulmonary recommendations and follow-up continue         DVT Prophylaxis:  SCD      Signed:  Irineo Cabot E. Fisher D. O.

## 2020-01-10 NOTE — PROGRESS NOTES
Nutrition Assessment for:   Consult for Concern she needs TPN, decreased intake X1 week, septic (Hospitalist)    Assessment:   Pt admitted with Left ureteral stone s/p cysto 1/7, sepsis, BHAVANA - improving, hypokalemia resolved, tachycardia, pulmonary edema/acute respiratory failure with hypoxia. IVF discontinued by pulmonary 1/9 and lasix initiated. PMH remarkable for cholecystectomy. Visit with patient and parents at bedside. Emesis times one last pm,  She c/o poor appetite and nausea. Nausea controlled with medications. Mom reports minimal intake since Sunday. Pt reports she ate several bites of foods this am prior to providers coming in and she discontinued eating. She request gelatin at my visit but declines offer as red was not an option from floor stock. She accepted a chocolate pudding and ate one bite prior to reporting it is thick. Pt reports her UBW is 104#. Last recorded BM 1/5. DIET REGULAR      Anthropometrics:   60\", Weight: 51 kg (112 lb 8 oz), no source specification , Body mass index is 21.97 kg/m². BMI class of Normal weight. Macronutrient needs: (using CBW (Current body weight) unknown 51 kg)  EER: 8205-1632 kcal/day 30-35 kcal/kg   EPR: 51-61 g/day 1-1.2g/kg      Intake/Comparative Standards: Reported po intake <25% of meals. This meets <25% of kcal needs and <25% of protein needs. Nutrition Diagnosis:  Inadequate oral intake related to acute status change as evidenced by pt with decreased po intake for ~5 days with acute illness meeting <25% estimated needs. .      Nutrition Intervention:  Meals and snacks: Continue current diet. ITems of prefernece provided to patient and added to stock on unit to promote oral intake. Medical food supplement therapy: Ensure clear daily, Ensure Enlive Daily, Magic cup daily. Given recent volume overload and functioning gi tract, pt is not a candidate for TPN at this time.   Will attempt to optimize oral intake with foods of pref and addition of oral supplementation. If po fails to progress recommend place NGFT for primary needs and consult RD for tube feeding management as pt has a functioning gut and TPN is not indicated. Coordination of nutrition care by a Nutrition Professional: Discussed with Linsey Woodard RN and Dr. Tri Davis. Discharge Nutrition Plan: Too soon to determine.       [place], [address], [address]

## 2020-01-10 NOTE — PROGRESS NOTES
Urology Progress Note    Admit Date: 1/7/2020    Subjective:     Patient had fever 101 at 0500 this AM.  Urine culture growing ecoli sensitive to rocephin and antibiotics adjusted to that yesterday. Pain better controlled this AM.  Had 1 emesis overnight. No flatus/BM. Catheter draining. Minimal ambulation. Lack of appetite. Pulmonologist consulted yesterday for O2 requirement and patient was diruesed/fluids stopped for pulmonary edema due to fluid resuscitation.  Hospitalist also following     Objective:     Patient Vitals for the past 8 hrs:   BP Temp Pulse Resp SpO2   01/10/20 0601  100.1 °F (37.8 °C) (!) 147     01/10/20 0540   (!) 147     01/10/20 0505 109/69 (!) 101.1 °F (38.4 °C) (!) 148 22 94 %   01/10/20 0419   (!) 113     01/10/20 0411 122/62 98.6 °F (37 °C) (!) 133 24 92 %   01/10/20 0016 115/61 99.1 °F (37.3 °C) (!) 127 18 92 %     01/09 1901 - 01/10 0700  In: -   Out: 2250 [Urine:2100]  01/08 0701 - 01/09 1900  In: 120 [P.O.:120]  Out: 0302 [Urine:2675]    Physical Exam:     Visit Vitals  /69 (BP 1 Location: Left arm, BP Patient Position: At rest)   Pulse (!) 147   Temp 100.1 °F (37.8 °C)   Resp 22   Wt 112 lb 8 oz (51 kg)   SpO2 94%   BMI 21.97 kg/m²        GENERAL: No acute distress, Awake, Alert, Oriented X 3  CARDIAC: regular rate and rhythm  CHEST AND LUNG: Bilateral crackles   ABDOMEN: soft, non-tender, non-distended  : Bright catheter draining clear yellow   SKIN: No rash, no erythema, no lacerations or abrasions, no ecchymosis  NEUROLOGIC: cranial nerves 2-12 grossly intact         Data Review   Recent Results (from the past 24 hour(s))   LACTIC ACID    Collection Time: 01/09/20 11:21 AM   Result Value Ref Range    Lactic acid 1.1 0.4 - 2.0 MMOL/L   VANCOMYCIN, RANDOM    Collection Time: 01/09/20 11:21 AM   Result Value Ref Range    Vancomycin, random 6.2 UG/ML   NT-PRO BNP    Collection Time: 01/09/20 11:21 AM   Result Value Ref Range    NT pro-BNP 1,517 (H) 5 - 125 PG/ML   PROCALCITONIN    Collection Time: 01/10/20  4:00 AM   Result Value Ref Range    Procalcitonin 285.22 ng/mL   METABOLIC PANEL, COMPREHENSIVE    Collection Time: 01/10/20  4:00 AM   Result Value Ref Range    Sodium 140 136 - 145 mmol/L    Potassium 3.7 3.5 - 5.1 mmol/L    Chloride 107 98 - 107 mmol/L    CO2 23 21 - 32 mmol/L    Anion gap 10 7 - 16 mmol/L    Glucose 84 65 - 100 mg/dL    BUN 14 6 - 23 MG/DL    Creatinine 1.62 (H) 0.6 - 1.0 MG/DL    GFR est AA 52 (L) >60 ml/min/1.73m2    GFR est non-AA 43 (L) >60 ml/min/1.73m2    Calcium 8.7 8.3 - 10.4 MG/DL    Bilirubin, total 1.7 (H) 0.2 - 1.1 MG/DL    ALT (SGPT) 31 12 - 65 U/L    AST (SGOT) 29 15 - 37 U/L    Alk. phosphatase 93 50 - 136 U/L    Protein, total 5.7 (L) 6.3 - 8.2 g/dL    Albumin 2.3 (L) 3.5 - 5.0 g/dL    Globulin 3.4 2.3 - 3.5 g/dL    A-G Ratio 0.7 (L) 1.2 - 3.5             Assessment:     Principal Problem:    Sepsis due to urinary tract infection ([facility] [address] (1/7/2020)    Active Problems:    Left ureteral stone (1/7/2020)      Hypoxia (1/9/2020)      Bilateral pleural effusion (1/9/2020)      20year old female with 4 mm L proximal ureteral stone and concern for urosepsis POD 3 s/p emergent L ureteral stent placement. Still having intermittent fevers, L flank pain and spasms improved. Labs improving but patient still tachycardic, tachypneic. Pulmonary and hospitalist also following. Plan:     -Trend labs and urine/blood cultures   -IVF stopped   -Continue IV Rocephin based on cultures   -Continue bergman catheter to drainage for I&O management   -Regular diet  -Pain meds PRN. -Anti-spasmodics PRN  -Appreciate hospitalist and Pulmonary assistance with management.   -Dispo: Anticipated discharge this weekend vs. Monday once patient starts to improve clinically and remains afebrile X 24 hours      Sorin Flores M.D.     [facility]  [address], [address]  Phone: [phone]  Fax: [phone]

## 2020-01-10 NOTE — PROGRESS NOTES
[facility]  Admission Date: 1/7/2020             Daily Progress Note: 1/10/2020    The patient's chart is reviewed and the patient is discussed with the staff. Patient is a 20 y.o.  female seen and evaluated at the request of Dr. Randy Emanuel. She was admitted with flank pain and a ureteral stone. She has undergone cystoscopy with left ureteral stent placement. She had BHAVANA at the time of admission which has corrected with hydration. She is currently on Vanc and Zosyn and her urine culture is + for E Coli. She is now afebrile and her WBC has decreased. She was noted to be hypoxic this morning and is currently on 1 liter of oxygen. Abdominal CT scan done yesterday and CXR done today reveal bilateral pleural effusions and edema. Her IV fluids were stopped today. BNP is 1517. Subjective:     4L negative yesterday with lasix. Had fever 101.1 today. Still having some flank pain. Just got pain meds.      Current Facility-Administered Medications   Medication Dose Route Frequency    promethazine (PHENERGAN) tablet 25 mg  25 mg Oral Q6H PRN    cefTRIAXone (ROCEPHIN) 2 g in 0.9% sodium chloride (MBP/ADV) 50 mL  2 g IntraVENous Q24H    magic mouthwash (CRISELDA) oral suspension 5 mL  5 mL Swish and Spit Q4H    fluconazole (DIFLUCAN) tablet 200 mg  200 mg Oral ONCE    potassium chloride (K-DUR, KLOR-CON) SR tablet 20 mEq  20 mEq Oral DAILY    acetaminophen (TYLENOL) tablet 650 mg  650 mg Oral Q6H    oxyCODONE IR (ROXICODONE) tablet 5-15 mg  5-15 mg Oral Q3H PRN    HYDROmorphone (PF) (DILAUDID) injection 0.5 mg  0.5 mg IntraVENous Q2H PRN    diphenhydrAMINE (BENADRYL) capsule 25 mg  25 mg Oral Q6H PRN    sodium chloride (NS) flush 10 mL  10 mL InterCATHeter Q8H    sodium chloride (NS) flush 10 mL  10 mL InterCATHeter PRN    tamsulosin (FLOMAX) capsule 0.4 mg  0.4 mg Oral DAILY    sodium chloride (NS) flush 5-40 mL  5-40 mL IntraVENous Q8H    sodium chloride (NS) flush 5-40 mL  5-40 mL IntraVENous PRN    naloxone (NARCAN) injection 0.4 mg  0.4 mg IntraVENous PRN    ondansetron (ZOFRAN) injection 4 mg  4 mg IntraVENous Q4H PRN    senna-docusate (PERICOLACE) 8.6-50 mg per tablet 1 Tab  1 Tab Oral BID PRN    hyoscyamine SL (LEVSIN/SL) tablet 0.125 mg  0.125 mg SubLINGual Q4H PRN     Review of Systems  Constitutional: +fever, negative chills, sweats  Cardiovascular: negative for chest pain, palpitations, syncope, edema  Gastrointestinal:  negative for dysphagia, reflux, vomiting, diarrhea, abdominal pain, or melena  Neurologic:  negative for focal weakness, numbness, headache    Objective:     Vitals:    01/10/20 0601 01/10/20 0702 01/10/20 0830 01/10/20 1214   BP:  114/51  113/64   Pulse: (!) 147 (!) 125  (!) 122   Resp:  20  20   Temp: 100.1 °F (37.8 °C) 98.5 °F (36.9 °C)  (!) 101.1 °F (38.4 °C)   SpO2:  92% 97% 98%   Weight:           Intake/Output Summary (Last 24 hours) at 1/10/2020 1227  Last data filed at 1/10/2020 0511  Gross per 24 hour   Intake 120 ml   Output 4250 ml   Net -4130 ml     Physical Exam:   Constitution:  the patient is well developed and in no acute distress  EENMT:  Sclera clear, pupils equal, oral mucosa moist  Respiratory: clear, 2L NC  Cardiovascular:  RRR without M,G,R  Gastrointestinal: soft and non-tender; with positive bowel sounds. Musculoskeletal: warm without cyanosis. There is no lower extremity edema. Skin:  no jaundice or rashes, no wounds   Neurologic: no gross neuro deficits     Psychiatric:  alert and oriented x 4    CXR: small bilateral pleural effusions and pulmonary edema                LAB  No results for input(s): GLUCPOC in the last 72 hours.     No lab exists for component: [address]     01/10/20  0545 01/09/20  0508 01/08/20  1409 01/08/20  0749   WBC 9.8 9.1 9.3 14.5*   HGB 11.5* 9.9* 10.8* 10.1*   HCT 34.3* 29.8* 32.2* 30.6*   * 132* 142* 124*     Recent Labs     01/10/20  0400 01/09/20  0508 01/08/20  1409 01/08/20  0541    140  --  142   K 3.7 3.8  --  3.4*    111*  --  112*   CO2 23 22  --  23   GLU 84 105*  --  135*   BUN 14 14  --  18   CREA 1.62* 1.49*  --  1.47*   MG  --  2.2 1.7*  --    CA 8.7 7.7*  --  8.0*   ALB 2.3*  --  2.1*  --    TBILI 1.7*  --  0.8  --    ALT 31  --  17  --    SGOT 29  --  17  --      No results for input(s): PH, PCO2, PO2, HCO3, PHI, PCO2I, PO2I, HCO3I in the last 72 hours. Recent Labs     01/09/20  1121 01/08/20  1409   LAC 1.1 1.8     Assessment:  (Medical Decision Making)     Hospital Problems  Never Reviewed          Codes Class Noted POA    Hypoxia ICD-10-CM: R09.02  ICD-9-CM: 799.02  1/9/2020 No        Bilateral pleural effusion ICD-10-CM: J90  ICD-9-CM: 511.9  1/9/2020 No        Left ureteral stone ICD-10-CM: N20.1  ICD-9-CM: 592.1  1/7/2020 Yes        * (Principal) Sepsis due to urinary tract infection ([facility] [address] ICD-10-CM: A41.9, N39.0  ICD-9-CM: 038.9, 995.91, 599.0  1/7/2020 Yes            Plan:  (Medical Decision Making)     --source control s/p stent per urology  --antibiotics narrowed to Rocephin yesterday. --If continues to fever would recheck cultures and broaden antibiotics while awaiting results and confirm with urology no further imaging needs to be performed  --aggressive diuresis yesterday, will stop lasix, continue off IVF and monitor    More than 50% of the time documented was spent in face-to-face contact with the patient and in the care of the patient on the floor/unit where the patient is located.     Ibrahima Candelaria MD

## 2020-01-10 NOTE — PROGRESS NOTES
Pt. Report \"chunky things\" on tongue. Appears to be thrush. Dr. Gavi Kiran of tongue and vaginal discharge. Per orders, Diflucan and magic mouthwash administered per [facility]. Pt fever >101. Dr. Gavi Kiran, PRN tylenol given and fever decreased to 98.4. Infectious disease consulted.

## 2020-01-11 LAB
ALBUMIN SERPL-MCNC: 2.3 G/DL (ref 3.5–5)
ALBUMIN/GLOB SERPL: 0.7 {RATIO} (ref 1.2–3.5)
ALP SERPL-CCNC: 104 U/L (ref 50–136)
ALT SERPL-CCNC: 33 U/L (ref 12–65)
ANION GAP SERPL CALC-SCNC: 8 MMOL/L (ref 7–16)
AST SERPL-CCNC: 37 U/L (ref 15–37)
BACTERIA SPEC CULT: NORMAL
BASOPHILS # BLD: 0 K/UL (ref 0–0.2)
BASOPHILS NFR BLD: 0 % (ref 0–2)
BILIRUB SERPL-MCNC: 1.3 MG/DL (ref 0.2–1.1)
BUN SERPL-MCNC: 19 MG/DL (ref 6–23)
CALCIUM SERPL-MCNC: 8.6 MG/DL (ref 8.3–10.4)
CHLORIDE SERPL-SCNC: 103 MMOL/L (ref 98–107)
CO2 SERPL-SCNC: 26 MMOL/L (ref 21–32)
CREAT SERPL-MCNC: 1.33 MG/DL (ref 0.6–1)
DIFFERENTIAL METHOD BLD: ABNORMAL
EOSINOPHIL # BLD: 0.1 K/UL (ref 0–0.8)
EOSINOPHIL NFR BLD: 1 % (ref 0.5–7.8)
ERYTHROCYTE [DISTWIDTH] IN BLOOD BY AUTOMATED COUNT: 12.7 % (ref 11.9–14.6)
GLOBULIN SER CALC-MCNC: 3.5 G/DL (ref 2.3–3.5)
GLUCOSE SERPL-MCNC: 79 MG/DL (ref 65–100)
HCT VFR BLD AUTO: 31.7 % (ref 35.8–46.3)
HGB BLD-MCNC: 10.6 G/DL (ref 11.7–15.4)
IMM GRANULOCYTES # BLD AUTO: 0.2 K/UL (ref 0–0.5)
IMM GRANULOCYTES NFR BLD AUTO: 1 % (ref 0–5)
LYMPHOCYTES # BLD: 1.4 K/UL (ref 0.5–4.6)
LYMPHOCYTES NFR BLD: 11 % (ref 13–44)
MCH RBC QN AUTO: 31.2 PG (ref 26.1–32.9)
MCHC RBC AUTO-ENTMCNC: 33.4 G/DL (ref 31.4–35)
MCV RBC AUTO: 93.2 FL (ref 79.6–97.8)
MONOCYTES # BLD: 1.1 K/UL (ref 0.1–1.3)
MONOCYTES NFR BLD: 9 % (ref 4–12)
NEUTS SEG # BLD: 9.7 K/UL (ref 1.7–8.2)
NEUTS SEG NFR BLD: 78 % (ref 43–78)
NRBC # BLD: 0 K/UL (ref 0–0.2)
PLATELET # BLD AUTO: 153 K/UL (ref 150–450)
PMV BLD AUTO: 11.2 FL (ref 9.4–12.3)
POTASSIUM SERPL-SCNC: 3.6 MMOL/L (ref 3.5–5.1)
PROT SERPL-MCNC: 5.8 G/DL (ref 6.3–8.2)
RBC # BLD AUTO: 3.4 M/UL (ref 4.05–5.2)
SERVICE CMNT-IMP: NORMAL
SODIUM SERPL-SCNC: 137 MMOL/L (ref 136–145)
WBC # BLD AUTO: 12.4 K/UL (ref 4.3–11.1)

## 2020-01-11 PROCEDURE — 94760 N-INVAS EAR/PLS OXIMETRY 1: CPT

## 2020-01-11 PROCEDURE — 65660000000 HC RM CCU STEPDOWN

## 2020-01-11 PROCEDURE — 99232 SBSQ HOSP IP/OBS MODERATE 35: CPT | Performed by: INTERNAL MEDICINE

## 2020-01-11 PROCEDURE — 80053 COMPREHEN METABOLIC PANEL: CPT

## 2020-01-11 PROCEDURE — 74011250637 HC RX REV CODE- 250/637: Performed by: NURSE PRACTITIONER

## 2020-01-11 PROCEDURE — 74011250636 HC RX REV CODE- 250/636: Performed by: UROLOGY

## 2020-01-11 PROCEDURE — 85025 COMPLETE CBC W/AUTO DIFF WBC: CPT

## 2020-01-11 PROCEDURE — 87389 HIV-1 AG W/HIV-1&-2 AB AG IA: CPT

## 2020-01-11 PROCEDURE — 74011250637 HC RX REV CODE- 250/637: Performed by: INTERNAL MEDICINE

## 2020-01-11 PROCEDURE — 74011250636 HC RX REV CODE- 250/636: Performed by: INTERNAL MEDICINE

## 2020-01-11 PROCEDURE — 36415 COLL VENOUS BLD VENIPUNCTURE: CPT

## 2020-01-11 PROCEDURE — 74011250636 HC RX REV CODE- 250/636: Performed by: NURSE PRACTITIONER

## 2020-01-11 PROCEDURE — 74011250637 HC RX REV CODE- 250/637: Performed by: UROLOGY

## 2020-01-11 PROCEDURE — 77010033678 HC OXYGEN DAILY

## 2020-01-11 PROCEDURE — 87536 HIV-1 QUANT&REVRSE TRNSCRPJ: CPT

## 2020-01-11 RX ORDER — FUROSEMIDE 10 MG/ML
INJECTION INTRAMUSCULAR; INTRAVENOUS
Status: ACTIVE
Start: 2020-01-11 | End: 2020-01-12

## 2020-01-11 RX ORDER — POTASSIUM CHLORIDE 20 MEQ/1
20 TABLET, EXTENDED RELEASE ORAL ONCE
Status: COMPLETED | OUTPATIENT
Start: 2020-01-11 | End: 2020-01-11

## 2020-01-11 RX ORDER — FUROSEMIDE 10 MG/ML
20 INJECTION INTRAMUSCULAR; INTRAVENOUS ONCE
Status: COMPLETED | OUTPATIENT
Start: 2020-01-11 | End: 2020-01-11

## 2020-01-11 RX ORDER — POLYETHYLENE GLYCOL 3350 17 G/17G
17 POWDER, FOR SOLUTION ORAL
Status: DISCONTINUED | OUTPATIENT
Start: 2020-01-11 | End: 2020-01-13 | Stop reason: HOSPADM

## 2020-01-11 RX ORDER — POLYETHYLENE GLYCOL 3350 17 G/17G
POWDER, FOR SOLUTION ORAL
Status: ACTIVE
Start: 2020-01-11 | End: 2020-01-12

## 2020-01-11 RX ADMIN — POTASSIUM CHLORIDE 20 MEQ: 20 TABLET, EXTENDED RELEASE ORAL at 17:03

## 2020-01-11 RX ADMIN — PROMETHAZINE HYDROCHLORIDE 25 MG: 25 TABLET ORAL at 22:07

## 2020-01-11 RX ADMIN — Medication 5 ML: at 08:56

## 2020-01-11 RX ADMIN — ACETAMINOPHEN 650 MG: 325 TABLET, FILM COATED ORAL at 12:28

## 2020-01-11 RX ADMIN — Medication 5 ML: at 04:00

## 2020-01-11 RX ADMIN — Medication 10 ML: at 06:10

## 2020-01-11 RX ADMIN — Medication 20 ML: at 04:00

## 2020-01-11 RX ADMIN — CIPROFLOXACIN 400 MG: 2 INJECTION, SOLUTION INTRAVENOUS at 15:15

## 2020-01-11 RX ADMIN — PROMETHAZINE HYDROCHLORIDE 25 MG: 25 TABLET ORAL at 17:02

## 2020-01-11 RX ADMIN — Medication 10 ML: at 13:34

## 2020-01-11 RX ADMIN — CIPROFLOXACIN 400 MG: 2 INJECTION, SOLUTION INTRAVENOUS at 04:00

## 2020-01-11 RX ADMIN — ACETAMINOPHEN 650 MG: 325 TABLET, FILM COATED ORAL at 17:03

## 2020-01-11 RX ADMIN — Medication 5 ML: at 13:33

## 2020-01-11 RX ADMIN — Medication 20 ML: at 21:01

## 2020-01-11 RX ADMIN — Medication 5 ML: at 15:15

## 2020-01-11 RX ADMIN — ONDANSETRON 4 MG: 2 INJECTION INTRAMUSCULAR; INTRAVENOUS at 13:32

## 2020-01-11 RX ADMIN — ACETAMINOPHEN 650 MG: 325 TABLET, FILM COATED ORAL at 06:10

## 2020-01-11 RX ADMIN — ACETAMINOPHEN 650 MG: 325 TABLET, FILM COATED ORAL at 23:13

## 2020-01-11 RX ADMIN — FUROSEMIDE 20 MG: 40 INJECTION, SOLUTION INTRAMUSCULAR; INTRAVENOUS at 12:30

## 2020-01-11 RX ADMIN — POTASSIUM CHLORIDE 20 MEQ: 20 TABLET, EXTENDED RELEASE ORAL at 08:55

## 2020-01-11 RX ADMIN — OXYCODONE HYDROCHLORIDE 10 MG: 5 TABLET ORAL at 03:26

## 2020-01-11 RX ADMIN — TAMSULOSIN HYDROCHLORIDE 0.4 MG: 0.4 CAPSULE ORAL at 08:55

## 2020-01-11 NOTE — PROGRESS NOTES
Was able to obtain small amount of blood return from midline this am, but unable to draw labs. Patient resting in room. No signs or symptoms of distress. No changes in status. Patient denies any further needs or pain at this time. Pt has been afebrile this shift. Hourly rounds performed;all pt needs met. Bed in low, locked position and call light/ personal items within reach. Will continue to monitor and give bedside shift report to oncoming day shift nurse.

## 2020-01-11 NOTE — PROGRESS NOTES
Hospitalist Progress Note     Admit Date:  2020 11:51 AM   [facility]   Age:  20 y.o.  :  2000   MRN:  [id number]   PCP:  Yony Ann MD  Treatment Team: Attending Provider: Rika Dudley MD; Care Manager: Rose Mary Thompson, RN; Utilization Review: Ivory Heimlich, RN; Consulting Provider: Robinson Saint, MD; Consulting Provider: Aldair Donato MD    Subjective:   HPI and or CC:  Summary and HPI from prior note:  Ms. PATIENTS FERNANDO [facility] is a 21 yo female with no significant PMH who was admitted by Urology and underwent a cystoscopy, left ureteral stent placement 20.  Was found to be in BHAVANA and creatinine has since trended down.  Hospitalist consulted for concern for sepsis 20 given persistent tachycardia and fever.  Pt temp 102.7 after tylenol, tachycardic at 155.  Pt was on rocephin and Vanc was added by Urology, pt did have Red Man Syndrome reaction to Vanc.  Also hypomagnesemic and hypokalemic, replaced and resolved. UA consistent with UTI. BC NGTD. Lactic acid 1.8, PCT 16. CXR showed concern for pneumonia. 20 CT abd/pelvis without contrast showed early pulmonary edema, ureteral stent in position. 20 Rocephin stopped and Zosyn added to Vanc. IVF decreased to 75 ml/hr from 150 ml/hr.  Repeat CXR shows worsening retrocardiac opacity left lung base atelectasis vs infection and pulmonary edema, small left greater than right pleural effusions worsened as well. She was placed on oxygen last night with normal O2 sats per nursing for \"comfort\". Today she went to xray off O2, came back with sats 85% on RA. O2 placed back on pt. Had midline placed 20. She denies cough, CP, SOB, abd pain other than her original left sided pain she presented with, n/v/d. She appears acutely ill however does appear improved from yesterday. 2020: Improving. Afebrile since yesterday afternoon.   One episode of what sounds like right lower quadrant colicky abdominal pain transient last evening. Still with lower extremity edema but respiratory status significantly improved. Showered yesterday and overall affect improved and subjectively energy level appears to be improving. No bowel movement in 6 days but very minimal oral intake    From ID notes:  · Ceftriaxone (1/7- changed to quinolone today January 10 and 2-week therapy planned. · Fluconazole x 1 (1/10)  · Vancomycin (1/8) - concern for \"red man's syndrome\" vs allergy during this infusion  · Zosyn (1/8-1/9)    Objective:     Patient Vitals for the past 24 hrs:   Temp Pulse Resp BP SpO2   01/11/20 1204 97.9 °F (36.6 °C) (!) 116 17 109/68 96 %   01/11/20 0810 98.2 °F (36.8 °C) (!) 113 16 108/60 95 %   01/11/20 0613 98.7 °F (37.1 °C)       01/11/20 0425 98.9 °F (37.2 °C) (!) 117 14 101/59 97 %   01/10/20 2244 98.9 °F (37.2 °C) (!) 112 18 113/77 92 %   01/10/20 1947 99.1 °F (37.3 °C) (!) 115 16 120/74 96 %   01/10/20 1512 98.4 °F (36.9 °C) (!) 111 18 106/62 97 %   01/10/20 1409 98.4 °F (36.9 °C)         Oxygen Therapy  O2 Sat (%): 96 % (01/11/20 1204)  Pulse via Oximetry: 105 beats per minute (01/10/20 0830)  O2 Device: Nasal cannula (01/10/20 0830)  O2 Flow Rate (L/min): 2 l/min (01/10/20 0830)    Intake/Output Summary (Last 24 hours) at 1/11/2020 1308  Last data filed at 1/11/2020 0425  Gross per 24 hour   Intake    Output 1450 ml   Net -1450 ml         REVIEW OF SYSTEMS: Comprehensive ROS performed and negative except as stated in HPI. Physical Examination:  General:    More alert pleasant  Head:  Normocephalic, atraumatic, nares patent  Eyes:   mucous membranes moist, no facial asymmetry  CV:   Tachycardia but overall rate improved. No gross gallop. No JVD  Lungs:   No rales rhonchi or wheezing obvious at time of exam with normal symmetric excursion  Abdomen:   No localized tenderness, no masses. Extremities: Some pretibial and feet edema pitting.   Moves all extremities without deformity  Skin: No petechiae or purpura  Neuro:  No tremor, cranial nerves II through XII grossly intact      Data Review:  I have reviewed all labs, meds, telemetry events, and studies from the last 24 hours. Recent Results (from the past 24 hour(s))   CBC WITH AUTOMATED DIFF    Collection Time: 01/11/20  7:13 AM   Result Value Ref Range    WBC 12.4 (H) 4.3 - 11.1 K/uL    RBC 3.40 (L) 4.05 - 5.2 M/uL    HGB 10.6 (L) 11.7 - 15.4 g/dL    HCT 31.7 (L) 35.8 - 46.3 %    MCV 93.2 79.6 - 97.8 FL    MCH 31.2 26.1 - 32.9 PG    MCHC 33.4 31.4 - 35.0 g/dL    RDW 12.7 11.9 - 14.6 %    PLATELET 340 157 - 687 K/uL    MPV 11.2 9.4 - 12.3 FL    ABSOLUTE NRBC 0.00 0.0 - 0.2 K/uL    DF AUTOMATED      NEUTROPHILS 78 43 - 78 %    LYMPHOCYTES 11 (L) 13 - 44 %    MONOCYTES 9 4.0 - 12.0 %    EOSINOPHILS 1 0.5 - 7.8 %    BASOPHILS 0 0.0 - 2.0 %    IMMATURE GRANULOCYTES 1 0.0 - 5.0 %    ABS. NEUTROPHILS 9.7 (H) 1.7 - 8.2 K/UL    ABS. LYMPHOCYTES 1.4 0.5 - 4.6 K/UL    ABS. MONOCYTES 1.1 0.1 - 1.3 K/UL    ABS. EOSINOPHILS 0.1 0.0 - 0.8 K/UL    ABS. BASOPHILS 0.0 0.0 - 0.2 K/UL    ABS. IMM. GRANS. 0.2 0.0 - 0.5 K/UL   METABOLIC PANEL, COMPREHENSIVE    Collection Time: 01/11/20  7:13 AM   Result Value Ref Range    Sodium 137 136 - 145 mmol/L    Potassium 3.6 3.5 - 5.1 mmol/L    Chloride 103 98 - 107 mmol/L    CO2 26 21 - 32 mmol/L    Anion gap 8 7 - 16 mmol/L    Glucose 79 65 - 100 mg/dL    BUN 19 6 - 23 MG/DL    Creatinine 1.33 (H) 0.6 - 1.0 MG/DL    GFR est AA >60 >60 ml/min/1.73m2    GFR est non-AA 54 (L) >60 ml/min/1.73m2    Calcium 8.6 8.3 - 10.4 MG/DL    Bilirubin, total 1.3 (H) 0.2 - 1.1 MG/DL    ALT (SGPT) 33 12 - 65 U/L    AST (SGOT) 37 15 - 37 U/L    Alk.  phosphatase 104 50 - 136 U/L    Protein, total 5.8 (L) 6.3 - 8.2 g/dL    Albumin 2.3 (L) 3.5 - 5.0 g/dL    Globulin 3.5 2.3 - 3.5 g/dL    A-G Ratio 0.7 (L) 1.2 - 3.5          All Micro Results     Procedure Component Value Units Date/Time    CULTURE, URINE [[id number]] Collected:  01/08/20 1745    Order Status:  Completed Specimen:  Urine from Bright Specimen Updated:  01/11/20 0749     Special Requests: GREY TUBE     Culture result: NO GROWTH 2 DAYS       CULTURE, BLOOD [[id number]] Collected:  01/07/20 1249    Order Status:  Completed Specimen:  Blood Updated:  01/11/20 0633     Special Requests: --        LEFT  Antecubital       Culture result: NO GROWTH 4 DAYS       CULTURE, BLOOD [[id number]] Collected:  01/07/20 1242    Order Status:  Completed Specimen:  Blood Updated:  01/11/20 0633     Special Requests: --        RIGHT  Antecubital       Culture result: NO GROWTH 4 DAYS       CULTURE, URINE [[id number]] Collected:  01/07/20 1307    Order Status:  Completed Specimen:  Cystoscopic Urine Updated:  01/09/20 0808     Special Requests: NO SPECIAL REQUESTS        Culture result: NO GROWTH 2 DAYS       INFLUENZA A & B AG (RAPID TEST) [[id number]] Collected:  01/08/20 1306    Order Status:  Completed Specimen:  Nasopharyngeal from Nasal washing Updated:  01/08/20 1334     Influenza A Ag NEGATIVE         Comment: NEGATIVE FOR THE PRESENCE OF INFLUENZA A ANTIGEN  INFECTION DUE TO INFLUENZA A CANNOT BE RULED OUT. BECAUSE THE ANTIGEN PRESENT IN THE SAMPLE MAY BE BELOW  THE DETECTION LIMIT OF THE TEST. A NEGATIVE TEST IS PRESUMPTIVE AND IT IS RECOMMENDED THAT THESE RESULTS BE CONFIRMED BY VIRAL CULTURE OR AN FDA-CLEARED INFLUENZA A AND B MOLECULAR ASSAY. Influenza B Ag NEGATIVE         Comment: NEGATIVE FOR THE PRESENCE OF INFLUENZA B ANTIGEN  INFECTION DUE TO INFLUENZA B CANNOT BE RULED OUT. BECAUSE THE ANTIGEN PRESENT IN THE SAMPLE MAY BE BELOW  THE DETECTION LIMIT OF THE TEST. A NEGATIVE TEST IS PRESUMPTIVE AND IT IS RECOMMENDED THAT THESE RESULTS BE CONFIRMED BY VIRAL CULTURE OR AN FDA-CLEARED INFLUENZA A AND B MOLECULAR ASSAY.           Source NASOPHARYNGEAL       CULTURE, BLOOD [[id number]]     Order Status:  Canceled Specimen:  Blood     CULTURE, BLOOD [[id number]]     Order Status:  Canceled Specimen:  Blood     CULTURE, URINE [[id number]]     Order Status:  Canceled Specimen:  Cath Urine           Current Meds:  Current Facility-Administered Medications   Medication Dose Route Frequency    furosemide (LASIX) 10 mg/mL injection        promethazine (PHENERGAN) tablet 25 mg  25 mg Oral Q6H PRN    magic mouthwash (CRISELDA) oral suspension 5 mL  5 mL Swish and Spit Q4H    acetaminophen (TYLENOL) tablet 650 mg  650 mg Oral Q6H PRN    ciprofloxacin (CIPRO) 400 mg in D5W IVPB (premix)  400 mg IntraVENous Q12H    potassium chloride (K-DUR, KLOR-CON) SR tablet 20 mEq  20 mEq Oral DAILY    acetaminophen (TYLENOL) tablet 650 mg  650 mg Oral Q6H    oxyCODONE IR (ROXICODONE) tablet 5-15 mg  5-15 mg Oral Q3H PRN    HYDROmorphone (PF) (DILAUDID) injection 0.5 mg  0.5 mg IntraVENous Q2H PRN    diphenhydrAMINE (BENADRYL) capsule 25 mg  25 mg Oral Q6H PRN    sodium chloride (NS) flush 10 mL  10 mL InterCATHeter Q8H    sodium chloride (NS) flush 10 mL  10 mL InterCATHeter PRN    tamsulosin (FLOMAX) capsule 0.4 mg  0.4 mg Oral DAILY    sodium chloride (NS) flush 5-40 mL  5-40 mL IntraVENous Q8H    sodium chloride (NS) flush 5-40 mL  5-40 mL IntraVENous PRN    naloxone (NARCAN) injection 0.4 mg  0.4 mg IntraVENous PRN    ondansetron (ZOFRAN) injection 4 mg  4 mg IntraVENous Q4H PRN    senna-docusate (PERICOLACE) 8.6-50 mg per tablet 1 Tab  1 Tab Oral BID PRN    hyoscyamine SL (LEVSIN/SL) tablet 0.125 mg  0.125 mg SubLINGual Q4H PRN       Diet:  DIET REGULAR  DIET NUTRITIONAL SUPPLEMENTS  DIET NUTRITIONAL SUPPLEMENTS  DIET NUTRITIONAL SUPPLEMENTS    Other Studies (last 24 hours):  No results found.     Assessment and Plan:     Hospital Problems as of 1/11/2020 Never Reviewed          Codes Class Noted - Resolved POA    Hypoxia ICD-10-CM: R09.02  ICD-9-CM: 799.02  1/9/2020 - Present No        Bilateral pleural effusion ICD-10-CM: J90  ICD-9-CM: 511.9  1/9/2020 - Present No        Left ureteral stone ICD-10-CM: N20.1  ICD-9-CM: 592.1  1/7/2020 - Present Yes        * (Principal) Sepsis due to urinary tract infection ([facility] [address] ICD-10-CM: A41.9, N39.0  ICD-9-CM: 038.9, 995.91, 599.0  1/7/2020 - Present Yes              A/P:    Left ureteral stone  Urology primary--postop day #4 status post left ureteral stent  S/p cysto 1/7/20        Sepsis  Improving slowly. Increased leukocytosis today but overall trend good. Monitor     BHAVANA  Creatinine improved now 1.33. Constipation   Very minimal oral intake but will order MiraLAX as needed     Hypokalemia  Continue to follow. Supplement as needed.     Hypomagnesemic  Replaced, resolvedrecheck if hypokalemia recurs. This is unchanged     Tachycardia  Sinus tach and appears to be improving slowly with patient clinical improvement     Pulmonary edema/acute resp failure with hypoxia  Appreciate pulmonary recommendations and follow-up continue         DVT Prophylaxis:  SCD      Signed:  Willie MANCIA

## 2020-01-11 NOTE — PROGRESS NOTES
Hourly rounds completed during this shift. All needs met at this time.  Report given to [employer]

## 2020-01-11 NOTE — PROGRESS NOTES
Subjective:   Daily Progress Note: 2020 1:17 PM  Had mild bladder pain, no pain now. Objective:     Visit Vitals  /68   Pulse (!) 116   Temp 97.9 °F (36.6 °C)   Resp 17   Wt 112 lb 8 oz (51 kg)   SpO2 96%   BMI 21.97 kg/m²    O2 Flow Rate (L/min): 2 l/min O2 Device: Nasal cannula    Temp (24hrs), Av.6 °F (37 °C), Min:97.9 °F (36.6 °C), Max:99.1 °F (37.3 °C)      1901 -  0700  In: -   Out:  [Urine:3550]  No intake/output data recorded. [unfilled]  [unfilled]  [unfilled]    Exam: abdomen soft, nontender      Data Review    Recent Results (from the past 24 hour(s))   CBC WITH AUTOMATED DIFF    Collection Time: 20  7:13 AM   Result Value Ref Range    WBC 12.4 (H) 4.3 - 11.1 K/uL    RBC 3.40 (L) 4.05 - 5.2 M/uL    HGB 10.6 (L) 11.7 - 15.4 g/dL    HCT 31.7 (L) 35.8 - 46.3 %    MCV 93.2 79.6 - 97.8 FL    MCH 31.2 26.1 - 32.9 PG    MCHC 33.4 31.4 - 35.0 g/dL    RDW 12.7 11.9 - 14.6 %    PLATELET 149 028 - 127 K/uL    MPV 11.2 9.4 - 12.3 FL    ABSOLUTE NRBC 0.00 0.0 - 0.2 K/uL    DF AUTOMATED      NEUTROPHILS 78 43 - 78 %    LYMPHOCYTES 11 (L) 13 - 44 %    MONOCYTES 9 4.0 - 12.0 %    EOSINOPHILS 1 0.5 - 7.8 %    BASOPHILS 0 0.0 - 2.0 %    IMMATURE GRANULOCYTES 1 0.0 - 5.0 %    ABS. NEUTROPHILS 9.7 (H) 1.7 - 8.2 K/UL    ABS. LYMPHOCYTES 1.4 0.5 - 4.6 K/UL    ABS. MONOCYTES 1.1 0.1 - 1.3 K/UL    ABS. EOSINOPHILS 0.1 0.0 - 0.8 K/UL    ABS. BASOPHILS 0.0 0.0 - 0.2 K/UL    ABS. IMM.  GRANS. 0.2 0.0 - 0.5 K/UL   METABOLIC PANEL, COMPREHENSIVE    Collection Time: 20  7:13 AM   Result Value Ref Range    Sodium 137 136 - 145 mmol/L    Potassium 3.6 3.5 - 5.1 mmol/L    Chloride 103 98 - 107 mmol/L    CO2 26 21 - 32 mmol/L    Anion gap 8 7 - 16 mmol/L    Glucose 79 65 - 100 mg/dL    BUN 19 6 - 23 MG/DL    Creatinine 1.33 (H) 0.6 - 1.0 MG/DL    GFR est AA >60 >60 ml/min/1.73m2    GFR est non-AA 54 (L) >60 ml/min/1.73m2    Calcium 8.6 8.3 - 10.4 MG/DL    Bilirubin, total 1.3 (H) 0.2 - 1.1 MG/DL    ALT (SGPT) 33 12 - 65 U/L    AST (SGOT) 37 15 - 37 U/L    Alk. phosphatase 104 50 - 136 U/L    Protein, total 5.8 (L) 6.3 - 8.2 g/dL    Albumin 2.3 (L) 3.5 - 5.0 g/dL    Globulin 3.5 2.3 - 3.5 g/dL    A-G Ratio 0.7 (L) 1.2 - 3.5         Assessment   Principal Problem:    Sepsis due to urinary tract infection ([facility] [address] (1/7/2020)    Active Problems:    Left ureteral stone (1/7/2020)      Hypoxia (1/9/2020)      Bilateral pleural effusion (1/9/2020)        Plan:  Continue Cipro. Just received IV Lasix, will leave bergman today.

## 2020-01-11 NOTE — PROGRESS NOTES
[facility]  Admission Date: 1/7/2020             Daily Progress Note: 1/11/2020    The patient's chart is reviewed and the patient is discussed with the staff.   19 y.o.  female seen and evaluated at the request of Dr. Karrie Camargo was admitted with flank pain and a ureteral stone. She has undergone cystoscopy with left ureteral stent placement. She had BHAVANA at the time of admission which is correcting.  Her urine culture is + for E Coli.  She was seen by ID due to recurrent fever. Abx changed to Cipro with plans for 14 day course.     She was noted to be hypoxic requiring oxygen support. Abdominal CT scan done yesterday and CXR done today reveal bilateral pleural effusions and edema. She has been treated with lasix with improvement. Subjective:     Still has swellling in her feet and she still feels short of breath. Asked to have oxygen replaced last night. Appetite some better. Was able to take a shower last night.      Current Facility-Administered Medications   Medication Dose Route Frequency    promethazine (PHENERGAN) tablet 25 mg  25 mg Oral Q6H PRN    magic mouthwash (CRISELDA) oral suspension 5 mL  5 mL Swish and Spit Q4H    acetaminophen (TYLENOL) tablet 650 mg  650 mg Oral Q6H PRN    ciprofloxacin (CIPRO) 400 mg in D5W IVPB (premix)  400 mg IntraVENous Q12H    potassium chloride (K-DUR, KLOR-CON) SR tablet 20 mEq  20 mEq Oral DAILY    acetaminophen (TYLENOL) tablet 650 mg  650 mg Oral Q6H    oxyCODONE IR (ROXICODONE) tablet 5-15 mg  5-15 mg Oral Q3H PRN    HYDROmorphone (PF) (DILAUDID) injection 0.5 mg  0.5 mg IntraVENous Q2H PRN    diphenhydrAMINE (BENADRYL) capsule 25 mg  25 mg Oral Q6H PRN    sodium chloride (NS) flush 10 mL  10 mL InterCATHeter Q8H    sodium chloride (NS) flush 10 mL  10 mL InterCATHeter PRN    tamsulosin (FLOMAX) capsule 0.4 mg  0.4 mg Oral DAILY    sodium chloride (NS) flush 5-40 mL  5-40 mL IntraVENous Q8H    sodium chloride (NS) flush 5-40 mL  5-40 mL IntraVENous PRN    naloxone (NARCAN) injection 0.4 mg  0.4 mg IntraVENous PRN    ondansetron (ZOFRAN) injection 4 mg  4 mg IntraVENous Q4H PRN    senna-docusate (PERICOLACE) 8.6-50 mg per tablet 1 Tab  1 Tab Oral BID PRN    hyoscyamine SL (LEVSIN/SL) tablet 0.125 mg  0.125 mg SubLINGual Q4H PRN     Objective:     Vitals:    01/10/20 2244 01/11/20 0425 01/11/20 0613 01/11/20 0810   BP: 113/77 101/59  108/60   Pulse: (!) 112 (!) 117  (!) 113   Resp: 18 14  16   Temp: 98.9 °F (37.2 °C) 98.9 °F (37.2 °C) 98.7 °F (37.1 °C) 98.2 °F (36.8 °C)   SpO2: 92% 97%  95%   Weight:         Intake and Output:   01/09 1901 - 01/11 0700  In: -   Out: 0201 [Urine:3550]  No intake/output data recorded. Physical Exam:   Constitutional:  the patient is well developed and in no acute distress  HEENT:  Sclera clear, pupils equal, oral mucosa moist  Lungs: decreased breath sounds from the posterior - right > left. Wearing 1 liter oxygen  Cardiovascular:  RRR without M,G,R  Abd/GI: soft and non-tender; with positive bowel sounds. Ext: warm without cyanosis. There is some lower leg edema - mostly in her feet. Musculoskeletal: moves all four extremities with equal strength  Skin:  no jaundice or rashes, no wounds   Neuro: no gross neuro deficits   Musculoskeletal: can ambulate. No deformity  Psychiatric: Calm. Review of Systems - Review of Systems   Constitutional: Positive for fever and malaise/fatigue. HENT: Negative. Respiratory: Positive for shortness of breath. Cardiovascular: Positive for leg swelling.    Gastrointestinal:        Anorexia      Lines: peripheral IV, bergman    CHEST XRAY:        LAB  Recent Labs     01/11/20  0713 01/10/20  0545 01/09/20  0508 01/08/20  1409   WBC 12.4* 9.8 9.1 9.3   HGB 10.6* 11.5* 9.9* 10.8*   HCT 31.7* 34.3* 29.8* 32.2*    145* 132* 142*     Recent Labs     01/11/20  0713 01/10/20  0400 01/09/20  0508 01/08/20  1409    140 140  --    K 3.6 3.7 3.8 --     107 111*  --    CO2 26 23 22  --    GLU 79 84 105*  --    BUN 19 14 14  --    CREA 1.33* 1.62* 1.49*  --    MG  --   --  2.2 1.7*   ALB 2.3* 2.3*  --  2.1*   SGOT 37 29  --  17     Assessment:  (Medical Decision Making)     Hospital Problems  Never Reviewed          Codes Class Noted POA    Hypoxia ICD-10-CM: R09.02  ICD-9-CM: 799.02  1/9/2020 No    Remains on oxygen support. Still with dyspnea and foot edema    Bilateral pleural effusion ICD-10-CM: J90  ICD-9-CM: 511.9  1/9/2020 No    As above    Left ureteral stone ICD-10-CM: N20.1  ICD-9-CM: 592.1  1/7/2020 Yes    S/p stent placement. * (Principal) Sepsis due to urinary tract infection ([facility] [address] ICD-10-CM: A41.9, N39.0  ICD-9-CM: 038.9, 995.91, 599.0  1/7/2020 Yes    E coli per cx        Plan:  (Medical Decision Making)   1. Will give another dose of lasix today. Still feels short of breath and still has some foot edema and decreased breath sounds. Renal function improving. Fluid balance neg 5.6 liters so far  2. Now on Cipro per ID. Had fever yesterday at noon - 101. WBC up some and PCT 16 -> 197. HIV pending and flu screen neg. 3. Wean oxygen as tolerated, repeat CXR prn    Tarik Hirsch NP    More than 50% of time documented was spent face-to-face contact with the patient and in the care of the patient on the floor/unit where the patient is located. Lungs:  Diminished, RA  Heart:  RRR with no Murmur/Rubs/Gallops    Additional Comments:  Non-cardiogenic pulmonary edema from sepsis. Would recommend intermittent lasix to continue to slowly improve edema. Patient seems to be clinically improving. Will sign off. Call with questions. I have spoken with and examined the patient. I agree with the above assessment and plan as documented.     Amparo Christy MD

## 2020-01-12 LAB
ANION GAP SERPL CALC-SCNC: 7 MMOL/L (ref 7–16)
BACTERIA SPEC CULT: NORMAL
BACTERIA SPEC CULT: NORMAL
BASOPHILS # BLD: 0 K/UL (ref 0–0.2)
BASOPHILS NFR BLD: 0 % (ref 0–2)
BUN SERPL-MCNC: 18 MG/DL (ref 6–23)
CALCIUM SERPL-MCNC: 8.3 MG/DL (ref 8.3–10.4)
CHLORIDE SERPL-SCNC: 101 MMOL/L (ref 98–107)
CO2 SERPL-SCNC: 28 MMOL/L (ref 21–32)
CREAT SERPL-MCNC: 1.1 MG/DL (ref 0.6–1)
DIFFERENTIAL METHOD BLD: ABNORMAL
EOSINOPHIL # BLD: 0.1 K/UL (ref 0–0.8)
EOSINOPHIL NFR BLD: 1 % (ref 0.5–7.8)
ERYTHROCYTE [DISTWIDTH] IN BLOOD BY AUTOMATED COUNT: 12.5 % (ref 11.9–14.6)
GLUCOSE SERPL-MCNC: 90 MG/DL (ref 65–100)
HCT VFR BLD AUTO: 32 % (ref 35.8–46.3)
HGB BLD-MCNC: 10.9 G/DL (ref 11.7–15.4)
HIV 1+2 AB+HIV1 P24 AG SERPL QL IA: NON REACTIVE
HIV1 RNA # SERPL NAA+PROBE: <20 COPIES/ML
HIV1 RNA SERPL NAA+PROBE-LOG#: NORMAL LOG10COPY/ML
IMM GRANULOCYTES # BLD AUTO: 0.4 K/UL (ref 0–0.5)
IMM GRANULOCYTES NFR BLD AUTO: 3 % (ref 0–5)
LYMPHOCYTES # BLD: 1.7 K/UL (ref 0.5–4.6)
LYMPHOCYTES NFR BLD: 12 % (ref 13–44)
MCH RBC QN AUTO: 31.2 PG (ref 26.1–32.9)
MCHC RBC AUTO-ENTMCNC: 34.1 G/DL (ref 31.4–35)
MCV RBC AUTO: 91.7 FL (ref 79.6–97.8)
MONOCYTES # BLD: 1.4 K/UL (ref 0.1–1.3)
MONOCYTES NFR BLD: 10 % (ref 4–12)
NEUTS SEG # BLD: 10.4 K/UL (ref 1.7–8.2)
NEUTS SEG NFR BLD: 74 % (ref 43–78)
NRBC # BLD: 0 K/UL (ref 0–0.2)
PLATELET # BLD AUTO: 179 K/UL (ref 150–450)
PMV BLD AUTO: 10.2 FL (ref 9.4–12.3)
POTASSIUM SERPL-SCNC: 3.3 MMOL/L (ref 3.5–5.1)
RBC # BLD AUTO: 3.49 M/UL (ref 4.05–5.2)
SERVICE CMNT-IMP: NORMAL
SERVICE CMNT-IMP: NORMAL
SODIUM SERPL-SCNC: 136 MMOL/L (ref 136–145)
WBC # BLD AUTO: 14.1 K/UL (ref 4.3–11.1)

## 2020-01-12 PROCEDURE — 74011250637 HC RX REV CODE- 250/637: Performed by: NURSE PRACTITIONER

## 2020-01-12 PROCEDURE — 36415 COLL VENOUS BLD VENIPUNCTURE: CPT

## 2020-01-12 PROCEDURE — 74011250636 HC RX REV CODE- 250/636: Performed by: INTERNAL MEDICINE

## 2020-01-12 PROCEDURE — 85025 COMPLETE CBC W/AUTO DIFF WBC: CPT

## 2020-01-12 PROCEDURE — 74011250637 HC RX REV CODE- 250/637: Performed by: UROLOGY

## 2020-01-12 PROCEDURE — 77030020256 HC SOL INJ NACL 0.9%  500ML

## 2020-01-12 PROCEDURE — 74011250637 HC RX REV CODE- 250/637: Performed by: INTERNAL MEDICINE

## 2020-01-12 PROCEDURE — 65660000000 HC RM CCU STEPDOWN

## 2020-01-12 PROCEDURE — 80048 BASIC METABOLIC PNL TOTAL CA: CPT

## 2020-01-12 RX ORDER — POTASSIUM CHLORIDE 20 MEQ/1
40 TABLET, EXTENDED RELEASE ORAL
Status: DISCONTINUED | OUTPATIENT
Start: 2020-01-12 | End: 2020-01-12

## 2020-01-12 RX ORDER — POTASSIUM CHLORIDE 20 MEQ/1
40 TABLET, EXTENDED RELEASE ORAL
Status: COMPLETED | OUTPATIENT
Start: 2020-01-12 | End: 2020-01-12

## 2020-01-12 RX ADMIN — Medication 5 ML: at 13:04

## 2020-01-12 RX ADMIN — Medication 10 ML: at 13:02

## 2020-01-12 RX ADMIN — Medication 10 ML: at 05:01

## 2020-01-12 RX ADMIN — CIPROFLOXACIN 400 MG: 2 INJECTION, SOLUTION INTRAVENOUS at 15:20

## 2020-01-12 RX ADMIN — ACETAMINOPHEN 650 MG: 325 TABLET, FILM COATED ORAL at 17:20

## 2020-01-12 RX ADMIN — Medication 10 ML: at 22:00

## 2020-01-12 RX ADMIN — POTASSIUM CHLORIDE 40 MEQ: 20 TABLET, EXTENDED RELEASE ORAL at 15:20

## 2020-01-12 RX ADMIN — TAMSULOSIN HYDROCHLORIDE 0.4 MG: 0.4 CAPSULE ORAL at 09:31

## 2020-01-12 RX ADMIN — CIPROFLOXACIN 400 MG: 2 INJECTION, SOLUTION INTRAVENOUS at 03:52

## 2020-01-12 RX ADMIN — ACETAMINOPHEN 650 MG: 325 TABLET, FILM COATED ORAL at 11:10

## 2020-01-12 RX ADMIN — Medication 5 ML: at 09:31

## 2020-01-12 RX ADMIN — PROMETHAZINE HYDROCHLORIDE 25 MG: 25 TABLET ORAL at 16:13

## 2020-01-12 RX ADMIN — PROMETHAZINE HYDROCHLORIDE 25 MG: 25 TABLET ORAL at 04:58

## 2020-01-12 RX ADMIN — ACETAMINOPHEN 650 MG: 325 TABLET, FILM COATED ORAL at 05:00

## 2020-01-12 RX ADMIN — Medication 10 ML: at 05:00

## 2020-01-12 RX ADMIN — POTASSIUM CHLORIDE 20 MEQ: 20 TABLET, EXTENDED RELEASE ORAL at 09:31

## 2020-01-12 NOTE — PROGRESS NOTES
Pt complaints of nausea twice this shift,managed per MAR. Pt restless during the night. Settled and resting well this morning. HR much improved and ran NSR per tele most of this shift. Afebrile and other vital WNL. Good urine output. Bed in low, locked position and call light/ personal items within reach. Will continue to monitor and give bedside shift report to oncoming day shift nurse.

## 2020-01-12 NOTE — PROGRESS NOTES
Bright removed as verbally ordered by MD. Torin Salgado. Instructed pt  To urinate in measuring device to monitor output.

## 2020-01-12 NOTE — PROGRESS NOTES
Pt and family asked if she could be wheeled around the floor and go outside to get some fresh air. Permission given, pt stated she felt up to going outside. Pt a/o x4.

## 2020-01-12 NOTE — PROGRESS NOTES
Subjective:   Daily Progress Note: 2020 10:39 AM  Had some N/V this AM, but otherwise feels stronger. Objective:     Visit Vitals  /72   Pulse 100   Temp 98.7 °F (37.1 °C)   Resp 18   Wt 112 lb 8 oz (51 kg)   SpO2 97%   BMI 21.97 kg/m²    O2 Flow Rate (L/min): 2 l/min O2 Device: Nasal cannula    Temp (24hrs), Av.4 °F (36.9 °C), Min:97.9 °F (36.6 °C), Max:99.3 °F (37.4 °C)      01/10 1901 -  07  In: -   Out: 2875 [BPPCU:4213]  701 - 1900  In: -   Out: 250 [Urine:250]    [unfilled]  [unfilled]  [unfilled]    Exam: abdomen soft, nontender. Data Review    Recent Results (from the past 24 hour(s))   METABOLIC PANEL, BASIC    Collection Time: 20  6:34 AM   Result Value Ref Range    Sodium 136 136 - 145 mmol/L    Potassium 3.3 (L) 3.5 - 5.1 mmol/L    Chloride 101 98 - 107 mmol/L    CO2 28 21 - 32 mmol/L    Anion gap 7 7 - 16 mmol/L    Glucose 90 65 - 100 mg/dL    BUN 18 6 - 23 MG/DL    Creatinine 1.10 (H) 0.6 - 1.0 MG/DL    GFR est AA >60 >60 ml/min/1.73m2    GFR est non-AA >60 >60 ml/min/1.73m2    Calcium 8.3 8.3 - 10.4 MG/DL   CBC WITH AUTOMATED DIFF    Collection Time: 20  6:34 AM   Result Value Ref Range    WBC 14.1 (H) 4.3 - 11.1 K/uL    RBC 3.49 (L) 4.05 - 5.2 M/uL    HGB 10.9 (L) 11.7 - 15.4 g/dL    HCT 32.0 (L) 35.8 - 46.3 %    MCV 91.7 79.6 - 97.8 FL    MCH 31.2 26.1 - 32.9 PG    MCHC 34.1 31.4 - 35.0 g/dL    RDW 12.5 11.9 - 14.6 %    PLATELET 363 201 - 315 K/uL    MPV 10.2 9.4 - 12.3 FL    ABSOLUTE NRBC 0.00 0.0 - 0.2 K/uL    DF AUTOMATED      NEUTROPHILS 74 43 - 78 %    LYMPHOCYTES 12 (L) 13 - 44 %    MONOCYTES 10 4.0 - 12.0 %    EOSINOPHILS 1 0.5 - 7.8 %    BASOPHILS 0 0.0 - 2.0 %    IMMATURE GRANULOCYTES 3 0.0 - 5.0 %    ABS. NEUTROPHILS 10.4 (H) 1.7 - 8.2 K/UL    ABS. LYMPHOCYTES 1.7 0.5 - 4.6 K/UL    ABS. MONOCYTES 1.4 (H) 0.1 - 1.3 K/UL    ABS. EOSINOPHILS 0.1 0.0 - 0.8 K/UL    ABS. BASOPHILS 0.0 0.0 - 0.2 K/UL    ABS. IMM. GRANS.  0.4 0.0 - 0.5 K/UL Assessment   Principal Problem:    Sepsis due to urinary tract infection ([facility] [address] (1/7/2020)    Active Problems:    Left ureteral stone (1/7/2020)      Hypoxia (1/9/2020)      Bilateral pleural effusion (1/9/2020)        Plan:  Slow improvement. Will remove bergman. She was encouraged to ambulate.

## 2020-01-12 NOTE — PROGRESS NOTES
Hospitalist Progress Note     Admit Date:  2020 11:51 AM   Name:  Rosamaria Cevallos [facility]   Age:  20 y.o.  :  2000   MRN:  [id number]   PCP:  River Gonzalez MD  Treatment Team: Attending Provider: Nneka Hazel MD; Care Manager: Lacey Mckeon RN; Utilization Review: Florinda Park RN; Consulting Provider: Phoenix Jensen MD; Consulting Provider: Carissa Ramirez MD; Charge Nurse: Batsheva Hanks    Subjective:   HPI and or CC:  Summary and HPI from prior note:  Ms. PATIENTS FERNANDO [facility] is a 21 yo female with no significant PMH who was admitted by Urology and underwent a cystoscopy, left ureteral stent placement 20.  Was found to be in BHAVANA and creatinine has since trended down.  Hospitalist consulted for concern for sepsis 20 given persistent tachycardia and fever.  Pt temp 102.7 after tylenol, tachycardic at 155.  Pt was on rocephin and Vanc was added by Urology, pt did have Red Man Syndrome reaction to Vanc.  Also hypomagnesemic and hypokalemic, replaced and resolved. UA consistent with UTI. BC NGTD. Lactic acid 1.8, PCT 16. CXR showed concern for pneumonia. 20 CT abd/pelvis without contrast showed early pulmonary edema, ureteral stent in position. 20 Rocephin stopped and Zosyn added to Vanc. IVF decreased to 75 ml/hr from 150 ml/hr.  Repeat CXR shows worsening retrocardiac opacity left lung base atelectasis vs infection and pulmonary edema, small left greater than right pleural effusions worsened as well. She was placed on oxygen last night with normal O2 sats per nursing for \"comfort\". Today she went to xray off O2, came back with sats 85% on RA. O2 placed back on pt. Had midline placed 20. She denies cough, CP, SOB, abd pain other than her original left sided pain she presented with, n/v/d. She appears acutely ill however does appear improved from yesterday.       2020:  Clinically improving, noted increased white blood count today 14,000 from 12,400. No differential.  Will follow up with differential in the a.m. Serum creatinine improved 1.1. Serum potassium is low at 3.3 and will receive additional supplementation follow-up level in the morning with evaluation of magnesium level. She passed a large mucoid clot from vagina yesterday and we are observing. From ID notes:  · Ceftriaxone (1/7- changed to quinolone today January 10 and 2-week therapy planned. · Fluconazole x 1 (1/10)  · Vancomycin (1/8) - concern for \"red man's syndrome\" vs allergy during this infusion  · Zosyn (1/8-1/9)    Objective:     Patient Vitals for the past 24 hrs:   Temp Pulse Resp BP SpO2   01/12/20 1130 98.8 °F (37.1 °C) 99 17 106/70 96 %   01/12/20 0458 98.7 °F (37.1 °C) 100 18 114/72 97 %   01/12/20 0023 99.3 °F (37.4 °C) 97 18 112/69 100 %   01/11/20 2031 98 °F (36.7 °C) 82 18 112/68 96 %   01/11/20 1649 98 °F (36.7 °C) (!) 101 18 113/68 98 %     Oxygen Therapy  O2 Sat (%): 96 % (01/12/20 1130)  Pulse via Oximetry: 105 beats per minute (01/10/20 0830)  O2 Device: Room air (01/12/20 1340)  O2 Flow Rate (L/min): 0 l/min (01/12/20 1340)    Intake/Output Summary (Last 24 hours) at 1/12/2020 1427  Last data filed at 1/12/2020 1037  Gross per 24 hour   Intake    Output 1450 ml   Net -1450 ml         REVIEW OF SYSTEMS: Comprehensive ROS performed and negative except as stated in HPI. Physical Examination:  Physical Exam  Vitals signs and nursing note reviewed. Constitutional:       General: She is not in acute distress. HENT:      Head: Normocephalic and atraumatic. Nose: No congestion. Mouth/Throat:      Pharynx: No oropharyngeal exudate. Eyes:      General: No scleral icterus. Extraocular Movements: Extraocular movements intact. Neck:      Musculoskeletal: Normal range of motion. Cardiovascular:      Rate and Rhythm: Normal rate and regular rhythm. Pulmonary:      Effort: No respiratory distress. Breath sounds: No wheezing.    Abdominal: General: Abdomen is flat. There is no distension. Tenderness: There is no tenderness. Musculoskeletal:         General: No tenderness or deformity. Lymphadenopathy:      Cervical: No cervical adenopathy. Skin:     General: Skin is warm. Coloration: Skin is not jaundiced. Neurological:      General: No focal deficit present. Mental Status: She is alert. Cranial Nerves: No cranial nerve deficit. Psychiatric:         Thought Content: Thought content normal.         Judgment: Judgment normal.           Data Review:  I have reviewed all labs, meds, telemetry events, and studies from the last 24 hours. Recent Results (from the past 24 hour(s))   METABOLIC PANEL, BASIC    Collection Time: 01/12/20  6:34 AM   Result Value Ref Range    Sodium 136 136 - 145 mmol/L    Potassium 3.3 (L) 3.5 - 5.1 mmol/L    Chloride 101 98 - 107 mmol/L    CO2 28 21 - 32 mmol/L    Anion gap 7 7 - 16 mmol/L    Glucose 90 65 - 100 mg/dL    BUN 18 6 - 23 MG/DL    Creatinine 1.10 (H) 0.6 - 1.0 MG/DL    GFR est AA >60 >60 ml/min/1.73m2    GFR est non-AA >60 >60 ml/min/1.73m2    Calcium 8.3 8.3 - 10.4 MG/DL   CBC WITH AUTOMATED DIFF    Collection Time: 01/12/20  6:34 AM   Result Value Ref Range    WBC 14.1 (H) 4.3 - 11.1 K/uL    RBC 3.49 (L) 4.05 - 5.2 M/uL    HGB 10.9 (L) 11.7 - 15.4 g/dL    HCT 32.0 (L) 35.8 - 46.3 %    MCV 91.7 79.6 - 97.8 FL    MCH 31.2 26.1 - 32.9 PG    MCHC 34.1 31.4 - 35.0 g/dL    RDW 12.5 11.9 - 14.6 %    PLATELET 778 959 - 753 K/uL    MPV 10.2 9.4 - 12.3 FL    ABSOLUTE NRBC 0.00 0.0 - 0.2 K/uL    DF AUTOMATED      NEUTROPHILS 74 43 - 78 %    LYMPHOCYTES 12 (L) 13 - 44 %    MONOCYTES 10 4.0 - 12.0 %    EOSINOPHILS 1 0.5 - 7.8 %    BASOPHILS 0 0.0 - 2.0 %    IMMATURE GRANULOCYTES 3 0.0 - 5.0 %    ABS. NEUTROPHILS 10.4 (H) 1.7 - 8.2 K/UL    ABS. LYMPHOCYTES 1.7 0.5 - 4.6 K/UL    ABS. MONOCYTES 1.4 (H) 0.1 - 1.3 K/UL    ABS. EOSINOPHILS 0.1 0.0 - 0.8 K/UL    ABS.  BASOPHILS 0.0 0.0 - 0.2 K/UL ABS. IMM. GRANS. 0.4 0.0 - 0.5 K/UL        All Micro Results     Procedure Component Value Units Date/Time    CULTURE, BLOOD [[id number]] Collected:  01/07/20 1242    Order Status:  Completed Specimen:  Blood Updated:  01/12/20 0613     Special Requests: --        RIGHT  Antecubital       Culture result: NO GROWTH 5 DAYS       CULTURE, BLOOD [[id number]] Collected:  01/07/20 1249    Order Status:  Completed Specimen:  Blood Updated:  01/12/20 0613     Special Requests: --        LEFT  Antecubital       Culture result: NO GROWTH 5 DAYS       CULTURE, URINE [[id number]] Collected:  01/08/20 1745    Order Status:  Completed Specimen:  Urine from Bright Specimen Updated:  01/11/20 0749     Special Requests: GREY TUBE     Culture result: NO GROWTH 2 DAYS       CULTURE, URINE [[id number]] Collected:  01/07/20 1307    Order Status:  Completed Specimen:  Cystoscopic Urine Updated:  01/09/20 0808     Special Requests: NO SPECIAL REQUESTS        Culture result: NO GROWTH 2 DAYS       INFLUENZA A & B AG (RAPID TEST) [[id number]] Collected:  01/08/20 1306    Order Status:  Completed Specimen:  Nasopharyngeal from Nasal washing Updated:  01/08/20 1334     Influenza A Ag NEGATIVE         Comment: NEGATIVE FOR THE PRESENCE OF INFLUENZA A ANTIGEN  INFECTION DUE TO INFLUENZA A CANNOT BE RULED OUT. BECAUSE THE ANTIGEN PRESENT IN THE SAMPLE MAY BE BELOW  THE DETECTION LIMIT OF THE TEST. A NEGATIVE TEST IS PRESUMPTIVE AND IT IS RECOMMENDED THAT THESE RESULTS BE CONFIRMED BY VIRAL CULTURE OR AN FDA-CLEARED INFLUENZA A AND B MOLECULAR ASSAY. Influenza B Ag NEGATIVE         Comment: NEGATIVE FOR THE PRESENCE OF INFLUENZA B ANTIGEN  INFECTION DUE TO INFLUENZA B CANNOT BE RULED OUT. BECAUSE THE ANTIGEN PRESENT IN THE SAMPLE MAY BE BELOW  THE DETECTION LIMIT OF THE TEST. A NEGATIVE TEST IS PRESUMPTIVE AND IT IS RECOMMENDED THAT THESE RESULTS BE CONFIRMED BY VIRAL CULTURE OR AN FDA-CLEARED INFLUENZA A AND B MOLECULAR ASSAY. Source NASOPHARYNGEAL       CULTURE, BLOOD [[id number]]     Order Status:  Canceled Specimen:  Blood     CULTURE, BLOOD [[id number]]     Order Status:  Canceled Specimen:  Blood     CULTURE, URINE [[id number]]     Order Status:  Canceled Specimen:  Cath Urine           Current Meds:  Current Facility-Administered Medications   Medication Dose Route Frequency    potassium chloride (K-DUR, KLOR-CON) SR tablet 40 mEq  40 mEq Oral NOW    polyethylene glycol (MIRALAX) packet 17 g  17 g Oral TID PRN    promethazine (PHENERGAN) tablet 25 mg  25 mg Oral Q6H PRN    magic mouthwash (CRISELDA) oral suspension 5 mL  5 mL Swish and Spit Q4H    acetaminophen (TYLENOL) tablet 650 mg  650 mg Oral Q6H PRN    ciprofloxacin (CIPRO) 400 mg in D5W IVPB (premix)  400 mg IntraVENous Q12H    potassium chloride (K-DUR, KLOR-CON) SR tablet 20 mEq  20 mEq Oral DAILY    acetaminophen (TYLENOL) tablet 650 mg  650 mg Oral Q6H    oxyCODONE IR (ROXICODONE) tablet 5-15 mg  5-15 mg Oral Q3H PRN    HYDROmorphone (PF) (DILAUDID) injection 0.5 mg  0.5 mg IntraVENous Q2H PRN    diphenhydrAMINE (BENADRYL) capsule 25 mg  25 mg Oral Q6H PRN    sodium chloride (NS) flush 10 mL  10 mL InterCATHeter Q8H    sodium chloride (NS) flush 10 mL  10 mL InterCATHeter PRN    tamsulosin (FLOMAX) capsule 0.4 mg  0.4 mg Oral DAILY    sodium chloride (NS) flush 5-40 mL  5-40 mL IntraVENous Q8H    sodium chloride (NS) flush 5-40 mL  5-40 mL IntraVENous PRN    naloxone (NARCAN) injection 0.4 mg  0.4 mg IntraVENous PRN    ondansetron (ZOFRAN) injection 4 mg  4 mg IntraVENous Q4H PRN    senna-docusate (PERICOLACE) 8.6-50 mg per tablet 1 Tab  1 Tab Oral BID PRN    hyoscyamine SL (LEVSIN/SL) tablet 0.125 mg  0.125 mg SubLINGual Q4H PRN       Diet:  DIET REGULAR  DIET NUTRITIONAL SUPPLEMENTS  DIET NUTRITIONAL SUPPLEMENTS  DIET NUTRITIONAL SUPPLEMENTS    Other Studies (last 24 hours):  No results found.     Assessment and Plan:     Hospital Problems as of 1/12/2020 Never Reviewed          Codes Class Noted - Resolved POA    Hypoxia ICD-10-CM: R09.02  ICD-9-CM: 799.02  1/9/2020 - Present No        Bilateral pleural effusion ICD-10-CM: J90  ICD-9-CM: 511.9  1/9/2020 - Present No        Left ureteral stone ICD-10-CM: N20.1  ICD-9-CM: 592.1  1/7/2020 - Present Yes        * (Principal) Sepsis due to urinary tract infection ([facility] [address] ICD-10-CM: A41.9, N39.0  ICD-9-CM: 038.9, 995.91, 599.0  1/7/2020 - Present Yes              A/P:    Left ureteral stone  Urology primary--postop day #5 status post left ureteral stent  S/p cysto 1/7/20        Sepsis  Slowly improving. Follow-up CBC with differential in the a.m.     BHAVANA  Resolvedfollow    Constipation   Very minimal oral intake but will order MiraLAX as needed     Hypokalemia  Additional supplementation today and follow-up in the a.m.     Hypomagnesemic  Replaced, resolved will recheck due to recurrent hypokalemia       Pulmonary edema/acute resp failure with hypoxia  Improved clinically with several doses of loop diuretic. Follow-up chest x-ray to confirm improvementchest x-ray as of January 9 showed worsening.         DVT Prophylaxis:  SCD      Signed:  Efrem MANCIA

## 2020-01-12 NOTE — PROGRESS NOTES
PT IS C/O PAIN AT URINATION, AND WIPING A SCANT AMOUNT OF BRIGHT RED BLOODY RESIDUAL WHILE CLEANING SELF.  Xavier Herrera MD AWARE

## 2020-01-13 ENCOUNTER — APPOINTMENT (OUTPATIENT)
Dept: GENERAL RADIOLOGY | Age: 20
DRG: 853 | End: 2020-01-13
Attending: INTERNAL MEDICINE
Payer: COMMERCIAL

## 2020-01-13 VITALS
HEART RATE: 97 BPM | RESPIRATION RATE: 18 BRPM | OXYGEN SATURATION: 97 % | DIASTOLIC BLOOD PRESSURE: 74 MMHG | BODY MASS INDEX: 21.97 KG/M2 | WEIGHT: 112.5 LBS | TEMPERATURE: 98.5 F | SYSTOLIC BLOOD PRESSURE: 110 MMHG

## 2020-01-13 LAB
ANION GAP SERPL CALC-SCNC: 6 MMOL/L (ref 7–16)
BASOPHILS # BLD: 0 K/UL (ref 0–0.2)
BASOPHILS NFR BLD: 0 % (ref 0–2)
BUN SERPL-MCNC: 13 MG/DL (ref 6–23)
CALCIUM SERPL-MCNC: 8.2 MG/DL (ref 8.3–10.4)
CHLORIDE SERPL-SCNC: 104 MMOL/L (ref 98–107)
CO2 SERPL-SCNC: 28 MMOL/L (ref 21–32)
CREAT SERPL-MCNC: 0.98 MG/DL (ref 0.6–1)
DIFFERENTIAL METHOD BLD: ABNORMAL
EOSINOPHIL # BLD: 0.1 K/UL (ref 0–0.8)
EOSINOPHIL NFR BLD: 1 % (ref 0.5–7.8)
ERYTHROCYTE [DISTWIDTH] IN BLOOD BY AUTOMATED COUNT: 12.3 % (ref 11.9–14.6)
GLUCOSE SERPL-MCNC: 86 MG/DL (ref 65–100)
HCT VFR BLD AUTO: 31 % (ref 35.8–46.3)
HGB BLD-MCNC: 10.8 G/DL (ref 11.7–15.4)
IMM GRANULOCYTES # BLD AUTO: 0.6 K/UL (ref 0–0.5)
IMM GRANULOCYTES NFR BLD AUTO: 5 % (ref 0–5)
LYMPHOCYTES # BLD: 2.4 K/UL (ref 0.5–4.6)
LYMPHOCYTES NFR BLD: 17 % (ref 13–44)
MAGNESIUM SERPL-MCNC: 2 MG/DL (ref 1.8–2.4)
MCH RBC QN AUTO: 32 PG (ref 26.1–32.9)
MCHC RBC AUTO-ENTMCNC: 34.8 G/DL (ref 31.4–35)
MCV RBC AUTO: 92 FL (ref 79.6–97.8)
MONOCYTES # BLD: 1.2 K/UL (ref 0.1–1.3)
MONOCYTES NFR BLD: 9 % (ref 4–12)
NEUTS SEG # BLD: 9.5 K/UL (ref 1.7–8.2)
NEUTS SEG NFR BLD: 69 % (ref 43–78)
NRBC # BLD: 0 K/UL (ref 0–0.2)
PLATELET # BLD AUTO: 251 K/UL (ref 150–450)
PMV BLD AUTO: 10 FL (ref 9.4–12.3)
POTASSIUM SERPL-SCNC: 3.4 MMOL/L (ref 3.5–5.1)
RBC # BLD AUTO: 3.37 M/UL (ref 4.05–5.2)
SODIUM SERPL-SCNC: 138 MMOL/L (ref 136–145)
WBC # BLD AUTO: 13.9 K/UL (ref 4.3–11.1)

## 2020-01-13 PROCEDURE — 71045 X-RAY EXAM CHEST 1 VIEW: CPT

## 2020-01-13 PROCEDURE — 74011250637 HC RX REV CODE- 250/637: Performed by: INTERNAL MEDICINE

## 2020-01-13 PROCEDURE — 85025 COMPLETE CBC W/AUTO DIFF WBC: CPT

## 2020-01-13 PROCEDURE — 74011250637 HC RX REV CODE- 250/637: Performed by: UROLOGY

## 2020-01-13 PROCEDURE — 74011250636 HC RX REV CODE- 250/636: Performed by: INTERNAL MEDICINE

## 2020-01-13 PROCEDURE — 83735 ASSAY OF MAGNESIUM: CPT

## 2020-01-13 PROCEDURE — 74011250637 HC RX REV CODE- 250/637: Performed by: NURSE PRACTITIONER

## 2020-01-13 PROCEDURE — 36415 COLL VENOUS BLD VENIPUNCTURE: CPT

## 2020-01-13 PROCEDURE — 80048 BASIC METABOLIC PNL TOTAL CA: CPT

## 2020-01-13 RX ORDER — PROMETHAZINE HYDROCHLORIDE 12.5 MG/1
12.5 TABLET ORAL
Qty: 20 TAB | Refills: 0 | Status: SHIPPED | OUTPATIENT
Start: 2020-01-13 | End: 2020-01-29 | Stop reason: CLARIF

## 2020-01-13 RX ORDER — OXYCODONE AND ACETAMINOPHEN 5; 325 MG/1; MG/1
1 TABLET ORAL
Qty: 15 TAB | Refills: 0 | Status: SHIPPED | OUTPATIENT
Start: 2020-01-13 | End: 2020-01-16

## 2020-01-13 RX ORDER — CIPROFLOXACIN 500 MG/1
500 TABLET ORAL 2 TIMES DAILY
Qty: 16 TAB | Refills: 0 | Status: SHIPPED | OUTPATIENT
Start: 2020-01-13 | End: 2020-01-21

## 2020-01-13 RX ADMIN — ACETAMINOPHEN 650 MG: 325 TABLET, FILM COATED ORAL at 05:07

## 2020-01-13 RX ADMIN — POTASSIUM BICARBONATE 40 MEQ: 782 TABLET, EFFERVESCENT ORAL at 11:37

## 2020-01-13 RX ADMIN — CIPROFLOXACIN 400 MG: 2 INJECTION, SOLUTION INTRAVENOUS at 04:37

## 2020-01-13 RX ADMIN — TAMSULOSIN HYDROCHLORIDE 0.4 MG: 0.4 CAPSULE ORAL at 09:30

## 2020-01-13 RX ADMIN — Medication 10 ML: at 06:00

## 2020-01-13 RX ADMIN — Medication 5 ML: at 11:41

## 2020-01-13 RX ADMIN — ACETAMINOPHEN 650 MG: 325 TABLET, FILM COATED ORAL at 11:36

## 2020-01-13 RX ADMIN — ACETAMINOPHEN 650 MG: 325 TABLET, FILM COATED ORAL at 00:48

## 2020-01-13 RX ADMIN — PROMETHAZINE HYDROCHLORIDE 25 MG: 25 TABLET ORAL at 00:48

## 2020-01-13 RX ADMIN — PROMETHAZINE HYDROCHLORIDE 25 MG: 25 TABLET ORAL at 11:36

## 2020-01-13 RX ADMIN — Medication 5 ML: at 09:32

## 2020-01-13 RX ADMIN — Medication 10 ML: at 05:16

## 2020-01-13 NOTE — PROGRESS NOTES
[address] January 13, 2020       RE: [facility]      To Whom It May Concern,    This is to certify that Rudy Wong was in the hospital from 1/7/2020 - 1/13/2020. Pt will likely require more outpatient care in the future. Please feel free to contact the hospital if you have any questions or concerns. Thank you for your assistance in this matter.       Sincerely,  Yakelin Woods RN

## 2020-01-13 NOTE — PROGRESS NOTES
Admit Date: 1/7/2020    Subjective:     Perico Tello appears to be feeling much better today. She is voiding s/p bergman removal.  Afebrile. VSS. Lungs clear. She has ambulated in the room yesterday but felt weak. Objective:     Patient Vitals for the past 8 hrs:   BP Temp Pulse Resp SpO2   01/13/20 0722 108/71 98.4 °F (36.9 °C) 93 18 95 %   01/13/20 0350 108/69 98.3 °F (36.8 °C) 90 18 95 %     No intake/output data recorded. 01/11 1901 - 01/13 0700  In: 480 [P.O.:480]  Out: 1300 [Urine:1300]    Physical Exam:  GENERAL: alert, cooperative, no distress  LUNG: clear to auscultation bilaterally   HEART: regular rate and rhythm, S1, S2   ABDOMEN: soft, non-tender  NEUROLOGIC: AOx3    Data Review   Recent Results (from the past 24 hour(s))   MAGNESIUM    Collection Time: 01/13/20  5:03 AM   Result Value Ref Range    Magnesium 2.0 1.8 - 2.4 mg/dL   METABOLIC PANEL, BASIC    Collection Time: 01/13/20  5:03 AM   Result Value Ref Range    Sodium 138 136 - 145 mmol/L    Potassium 3.4 (L) 3.5 - 5.1 mmol/L    Chloride 104 98 - 107 mmol/L    CO2 28 21 - 32 mmol/L    Anion gap 6 (L) 7 - 16 mmol/L    Glucose 86 65 - 100 mg/dL    BUN 13 6 - 23 MG/DL    Creatinine 0.98 0.6 - 1.0 MG/DL    GFR est AA >60 >60 ml/min/1.73m2    GFR est non-AA >60 >60 ml/min/1.73m2    Calcium 8.2 (L) 8.3 - 10.4 MG/DL   CBC WITH AUTOMATED DIFF    Collection Time: 01/13/20  5:03 AM   Result Value Ref Range    WBC 13.9 (H) 4.3 - 11.1 K/uL    RBC 3.37 (L) 4.05 - 5.2 M/uL    HGB 10.8 (L) 11.7 - 15.4 g/dL    HCT 31.0 (L) 35.8 - 46.3 %    MCV 92.0 79.6 - 97.8 FL    MCH 32.0 26.1 - 32.9 PG    MCHC 34.8 31.4 - 35.0 g/dL    RDW 12.3 11.9 - 14.6 %    PLATELET 858 095 - 191 K/uL    MPV 10.0 9.4 - 12.3 FL    ABSOLUTE NRBC 0.00 0.0 - 0.2 K/uL    DF AUTOMATED      NEUTROPHILS 69 43 - 78 %    LYMPHOCYTES 17 13 - 44 %    MONOCYTES 9 4.0 - 12.0 %    EOSINOPHILS 1 0.5 - 7.8 %    BASOPHILS 0 0.0 - 2.0 %    IMMATURE GRANULOCYTES 5 0.0 - 5.0 %    ABS. NEUTROPHILS 9.5 (H) 1.7 - 8.2 K/UL    ABS. LYMPHOCYTES 2.4 0.5 - 4.6 K/UL    ABS. MONOCYTES 1.2 0.1 - 1.3 K/UL    ABS. EOSINOPHILS 0.1 0.0 - 0.8 K/UL    ABS. BASOPHILS 0.0 0.0 - 0.2 K/UL    ABS. IMM. GRANS. 0.6 (H) 0.0 - 0.5 K/UL       Assessment:     Principal Problem:    Sepsis due to urinary tract infection ([address] (1/7/2020)    Active Problems:    Left ureteral stone (1/7/2020)      Hypoxia (1/9/2020)      Bilateral pleural effusion (1/9/2020)      20year old female with 4 mm L proximal ureteral stone and concern for urosepsis POD 6 s/p emergent L ureteral stent placement. Plan:     CXR improved today. Pt feeling much better. Afebrile. Voiding. Spoke with hospitalist, pt okay to d/c home. Pt to ambulate today prior to d/c. She will d/c home with PO cipro per ID initial reccs with EOT 1/21. She will f/u next week for URS, we will notify her of procedure time/date. She will need to f/u with PCP within 1 week. Omer Stroud NP  [facility]    Patient appears much better today. Afebrile. Pain controlled. Ambulating. Visit Vitals  /74 (BP 1 Location: Left arm, BP Patient Position: At rest)   Pulse 97   Temp 98.5 °F (36.9 °C)   Resp 18   Wt 112 lb 8 oz (51 kg)   SpO2 97%   BMI 21.97 kg/m²        GENERAL: No acute distress, Awake, Alert, Oriented X 3, Gait normal  CARDIAC: regular rate and rhythm  CHEST AND LUNG: Easy work of breathing, clear to auscultation bilaterally, no cyanosis  ABDOMEN: soft, non tender, non-distended, positive bowel sounds, no organomegaly, no palpable masses, no guarding, no rebound tenderness  SKIN: No rash, no erythema, no lacerations or abrasions, no ecchymosis  NEUROLOGIC: cranial nerves 2-12 grossly intact     Plan:  Discharge home today on PO antibiotics  Will arrange Cystoscopy, LEFT Ureteroscopy, Laser Lithotripsy, LEFT Ureteral Stent Exchange  No sooner than 1 week from now. I have reviewed the above note and examined the patient.   I agree with the exam, assessment and plan. Sorin Rodriguez M.D.     [facility]  [facility] [facility]  Phone: [phone]  Fax: [phone]

## 2020-01-13 NOTE — PROGRESS NOTES
Chart screened by  for discharge planning. WBC remain elevated, HGB low, K+ low. Patient will likely discharge mid to late week. CM will continue to follow patient during hospitalization for discharge planning and needs. No CM needs have been identified at this time. Please consult  if any new issues arise.

## 2020-01-13 NOTE — PROGRESS NOTES
Hourly rounds complete this shift, no new complaints at this time, Patient c/o nausea,prn  medication given bed in low, locked position, call light and bedside table within reach,  all needs met. Will continue to monitor Report to day shift nurse.

## 2020-01-13 NOTE — PROGRESS NOTES
Care Management Interventions  Transition of Care Consult (CM Consult): Discharge Planning  Discharge Durable Medical Equipment: No  Physical Therapy Consult: No  Occupational Therapy Consult: No  Speech Therapy Consult: No  Discharge Location  Discharge Placement: Home    Patient to discharge home with family. No CM needs identified at this time. All milestones have been met. Patient will transport home with family.

## 2020-01-13 NOTE — DISCHARGE SUMMARY
Discharge Summary     Patient: Timmy Carcamo MRN: [id number]  SSN: [id number]    YOB: 2000  Age: 20 y.o. Sex: female      Allergies: Vancomycin    Admit Date: 1/7/2020    Discharge Date: 1/13/2020     * Admission Diagnoses:  Left ureteral stone [N20.1]  Left ureteral stone [N20.1]  Sepsis due to urinary tract infection ([facility] [A41.9, N39.0]  Sepsis ([facility] [A41.9]  Left ureteral stone [N20.1]     * Discharge Diagnoses:   Hospital Problems as of 1/13/2020 Never Reviewed          Codes Class Noted - Resolved POA    Hypoxia ICD-10-CM: R09.02  ICD-9-CM: 799.02  1/9/2020 - Present No        Bilateral pleural effusion ICD-10-CM: J90  ICD-9-CM: 511.9  1/9/2020 - Present No        Left ureteral stone ICD-10-CM: N20.1  ICD-9-CM: 592.1  1/7/2020 - Present Yes        * (Principal) Sepsis due to urinary tract infection ([facility] ICD-10-CM: A41.9, N39.0  ICD-9-CM: 038.9, 995.91, 599.0  1/7/2020 - Present Yes               * Procedures for this admission:   Procedure(s):  CYSTOSCOPY Left URETERAL STENT INSERTION      * Disposition: Home    * Discharged Condition: improved    * Hospital Course:    Ms. Johnathon Navarro is a 24-year-old female who was diagnosed with a 4 mm left proximal ureteral stone with left hydronephrosis on 1/5 in the ER.  At the time, her urine was negative for infection. She was sent home with flomax/pain medication and scheduled f/u in our office, she was seen for earlier appt on 1/7 due to c/o continued vomiting and temp of 99.9. On arrival to office, she was ill appearing with chills with temp of 101.8. , SBP 90s.  KUB showed the proximal ureteral stone.  Urine under scope appears infected. She was sent urgently to [facility] where she went for urgent cystoscopy and left ureteral stent insertion on 1/7/20. Started on IV rocephin. Blood/urine cultures taken. She continued to have tachycardia and was ill appearing with O2 requirements.   Cefepime/Vancomycin added, to which pt had reaction with a rash, hospitalist consulted for assistance. CXR ordered and showed pulmonary edema. CT a/p without contrast ordered and showed early pulmonary edema, ureteral stent in position. Pulmonary consulted. Started on diuresis. No plans for thoracentesis. Urine culture grew E. Coli. Vanc d/c'd, cefepime d/c'd, rocephin restarted. Pt continued to have fevers. ID consulted. Abx changed to cipro. Bright removed, pt voided well. She is now feeling much better today. CXR improved, on room air. Pulm/hosp signed off. She has remained afebrile. Labs improved. She has ambulated the halls. She will d/c home today with PO cipro until 1/21 and return for cystoscopy, LEFT ureteroscopy, laser lithotripsy, LEFT ureteral stent exchange which is being scheduled. She will f/u with PCP within 1 week. Patient Active Problem List   Diagnosis Code    Left ureteral stone N20.1    Sepsis due to urinary tract infection ([facility] A41.9, N39.0    Hypoxia R09.02    Bilateral pleural effusion J90            Discharge Medications:   Current Discharge Medication List      START taking these medications    Details   ciprofloxacin HCl (CIPRO) 500 mg tablet Take 1 Tab by mouth two (2) times a day for 8 days. Qty: 16 Tab, Refills: 0      promethazine (PHENERGAN) 12.5 mg tablet Take 1 Tab by mouth every six (6) hours as needed for Nausea. Qty: 20 Tab, Refills: 0      oxyCODONE-acetaminophen (PERCOCET) 5-325 mg per tablet Take 1 Tab by mouth every four (4) hours as needed for Pain for up to 3 days. Max Daily Amount: 6 Tabs. Qty: 15 Tab, Refills: 0    Associated Diagnoses: Left ureteral stone         CONTINUE these medications which have NOT CHANGED    Details   tamsulosin (FLOMAX) 0.4 mg capsule Take 1 Cap by mouth daily for 10 days. Qty: 10 Cap, Refills: 0    Associated Diagnoses: Kidney stone      ondansetron (ZOFRAN ODT) 8 mg disintegrating tablet Take 1 Tab by mouth every eight (8) hours as needed for Nausea.   Qty: 12 Tab, Refills: 0    Associated Diagnoses: Kidney stone         STOP taking these medications       HYDROcodone-acetaminophen (NORCO) 7.5-325 mg per tablet Comments:   Reason for Stopping:                * Follow-up Care/Patient Instructions: Activity: Activity as tolerated, stay hydrated  Diet: Regular Diet  Wound Care: None needed    Follow-up Information     Follow up With Specialties Details Why Contact Info    Emmie Nichols Ma, MD Pediatrics On 1/20/2020 at 9:00 [address]  [phone]      [address] [place] [address]   Office will call patient with appointment date and time. [address] [place] [address] 6  [phone]           I have reviewed the above note and examined the patient. I agree with the exam, assessment and plan. Sorin Riddle M.D.     [facility]  [facility]4 [place] [address] [address]  Phone: [phone]  Fax: [phone]

## 2020-01-13 NOTE — DISCHARGE INSTRUCTIONS
Patient Education   Patient Education     DISCHARGE SUMMARY from Nurse    PATIENT INSTRUCTIONS:    After general anesthesia or intravenous sedation, for 24 hours or while taking prescription Narcotics:  · Limit your activities  · Do not drive and operate hazardous machinery  · Do not make important personal or business decisions  · Do  not drink alcoholic beverages  · If you have not urinated within 8 hours after discharge, please contact your surgeon on call. Report the following to your surgeon:  · Excessive pain, swelling, redness or odor of or around the surgical area  · Temperature over 100.5  · Nausea and vomiting lasting longer than 4 hours or if unable to take medications  · Any signs of decreased circulation or nerve impairment to extremity: change in color, persistent  numbness, tingling, coldness or increase pain  · Any questions    What to do at Home:  Recommended activity: Activity as tolerated, complete full course of antibiotics     If you experience any of the following symptoms fever, pain, nausea or vomiting, difficulty urinating or any other worrisome symptoms, please follow up with pcp. *  Please give a list of your current medications to your Primary Care Provider. *  Please update this list whenever your medications are discontinued, doses are      changed, or new medications (including over-the-counter products) are added. *  Please carry medication information at all times in case of emergency situations. These are general instructions for a healthy lifestyle:    No smoking/ No tobacco products/ Avoid exposure to second hand smoke  Surgeon General's Warning:  Quitting smoking now greatly reduces serious risk to your health.     Obesity, smoking, and sedentary lifestyle greatly increases your risk for illness    A healthy diet, regular physical exercise & weight monitoring are important for maintaining a healthy lifestyle    You may be retaining fluid if you have a history of heart failure or if you experience any of the following symptoms:  Weight gain of 3 pounds or more overnight or 5 pounds in a week, increased swelling in our hands or feet or shortness of breath while lying flat in bed. Please call your doctor as soon as you notice any of these symptoms; do not wait until your next office visit. The discharge information has been reviewed with the patient. The patient verbalized understanding. Discharge medications reviewed with the patient and appropriate educational materials and side effects teaching were provided. ___________________________________________________________________________________________________________________________________     Sepsis: Care Instructions  Your Care Instructions    Sepsis is an intense reaction to an infection. It can cause deadly damage to the body and lead to a dangerously low blood pressure. You may have inflammation across large areas of your body. It can damage tissue and even go deep into your organs. Infections that can lead to sepsis include:  · A skin infection such as from a cut. · A lung infection like pneumonia. · A kidney infection. · A gut infection such as E. coli. It's important to care for yourself and try to avoid infections so that you don't get sepsis again. Follow-up care is a key part of your treatment and safety. Be sure to make and go to all appointments, and call your doctor if you are having problems. It's also a good idea to know your test results and keep a list of the medicines you take. How can you care for yourself at home? · If your doctor prescribed antibiotics, take them as directed. Do not stop taking them just because you feel better. You need to take the full course of antibiotics. · Help prevent infections that could lead to sepsis:  ? Try to avoid colds and flu. If you must be around people who have a cold or the flu, wash your hands often. And get a flu vaccine every year.   ? Get a pneumococcal vaccine shot (to prevent pneumonia, meningitis, and other infections). If you have had one before, ask your doctor if you need another dose. ? Clean any wounds or scrapes. · Do not smoke or use other tobacco products. When you quit smoking, you are less likely to get a cold, the flu, bronchitis, and pneumonia. If you need help quitting, talk to your doctor about stop-smoking programs and medicines. These can increase your chances of quitting for good. · To prevent dehydration, drink plenty of fluids. Choose water and other caffeine-free clear liquids until you feel better. If you have kidney, heart, or liver disease and have to limit fluids, talk with your doctor before you increase the amount of fluids you drink. · Eat a healthy diet. Include fruits, vegetables, and whole grains in your diet every day. · If your doctor recommends it, try doing some physical activity. Walking is a good choice. Bit by bit, increase the amount you walk every day. When should you call for help? Call 911 anytime you think you may need emergency care. For example, call if:    · You passed out (lost consciousness).    Call your doctor now or seek immediate medical care if:    · You have symptoms of sepsis. These may include:  ? Shortness of breath. ? A fast heart rate. ? Cool, pale, or clammy skin. ? Feeling confused.     · You are dizzy or lightheaded, or you feel like you may faint.     · You have a fever or chills.    Watch closely for changes in your health, and be sure to contact your doctor if:    · You do not get better as expected. Where can you learn more? Go to http://martha-jose j.info/. Enter K068 in the search box to learn more about \"Sepsis: Care Instructions. \"  Current as of: June 26, 2019  Content Version: 12.2  © 6938-4524 [employer]. Care instructions adapted under license by [employer] (which disclaims liability or warranty for this information).  If you have questions about a medical condition or this instruction, always ask your healthcare professional. [place] [address] any warranty or liability for your use of this information. Urinary Tract Infection in Women: Care Instructions  Your Care Instructions    A urinary tract infection, or UTI, is a general term for an infection anywhere between the kidneys and the urethra (where urine comes out). Most UTIs are bladder infections. They often cause pain or burning when you urinate. UTIs are caused by bacteria and can be cured with antibiotics. Be sure to complete your treatment so that the infection goes away. Follow-up care is a key part of your treatment and safety. Be sure to make and go to all appointments, and call your doctor if you are having problems. It's also a good idea to know your test results and keep a list of the medicines you take. How can you care for yourself at home? · Take your antibiotics as directed. Do not stop taking them just because you feel better. You need to take the full course of antibiotics. · Drink extra water and other fluids for the next day or two. This may help wash out the bacteria that are causing the infection. (If you have kidney, heart, or liver disease and have to limit fluids, talk with your doctor before you increase your fluid intake.)  · Avoid drinks that are carbonated or have caffeine. They can irritate the bladder. · Urinate often. Try to empty your bladder each time. · To relieve pain, take a hot bath or lay a heating pad set on low over your lower belly or genital area. Never go to sleep with a heating pad in place. To prevent UTIs  · Drink plenty of water each day. This helps you urinate often, which clears bacteria from your system. (If you have kidney, heart, or liver disease and have to limit fluids, talk with your doctor before you increase your fluid intake.)  · Urinate when you need to.   · Urinate right after you have sex.  · Change sanitary pads often. · Avoid douches, bubble baths, feminine hygiene sprays, and other feminine hygiene products that have deodorants. · After going to the bathroom, wipe from front to back. When should you call for help? Call your doctor now or seek immediate medical care if:    · Symptoms such as fever, chills, nausea, or vomiting get worse or appear for the first time.     · You have new pain in your back just below your rib cage. This is called flank pain.     · There is new blood or pus in your urine.     · You have any problems with your antibiotic medicine.    Watch closely for changes in your health, and be sure to contact your doctor if:    · You are not getting better after taking an antibiotic for 2 days.     · Your symptoms go away but then come back. Where can you learn more? Go to http://martha-jose j.info/. Enter W275 in the search box to learn more about \"Urinary Tract Infection in Women: Care Instructions. \"  Current as of: December 19, 2018  Content Version: 12.2  © 4818-0780 [employer]. Care instructions adapted under license by [employer] (which disclaims liability or warranty for this information). If you have questions about a medical condition or this instruction, always ask your healthcare professional. [address] any warranty or liability for your use of this information.

## 2020-01-13 NOTE — PROGRESS NOTES
Infectious Disease Consult    Today's Date: 2020   Admit Date: 2020    Impression:   · Left pyelonephritis with E coli  · Left partially obstructive nephrolithiasis  · Bilateral pleural effusions    Plan:      · Continue cipro through 19. Counseled on potential toxicity  · Plan noted for discharge which I agree with. No ID followup needed. Anti-infectives:   · Ceftriaxone (-   · Fluconazole x 1 (1/10)  · Vancomycin () - concern for \"red man's syndrome\" vs allergy during this infusion  · Zosyn (-)    Subjective:   No complaints. No pain. Fever down. Tolerating cipro well. Allergies   Allergen Reactions    Vancomycin Other (comments)     Possible Red Man Syndrome on 2020 from vancomycin 1000 mg given over 1 hour. Need to administer vancomycin doses more slowly than normal to avoid reaction in the future. Note, this is NOT a true allergy, just a reaction caused by histamine release when vancomycin infused too rapidly. If it becomes an issue with subsequent doses, consider pre-treatment with benadryl before vancomycin doses. Review of Systems:  A comprehensive review of systems was negative except for that written in the History of Present Illness.     Objective:     Visit Vitals  /74 (BP 1 Location: Left arm, BP Patient Position: At rest)   Pulse 97   Temp 98.5 °F (36.9 °C)   Resp 18   Wt 51 kg (112 lb 8 oz)   SpO2 97%   BMI 21.97 kg/m²     Temp (24hrs), Av.5 °F (36.9 °C), Min:98 °F (36.7 °C), Max:99.2 °F (37.3 °C)       Lines:  Peripheral IV:       Physical Exam:    General:  Comfortable in bed; nad   Eyes:  Anicteric sclerae   Mouth/Throat: Mucous membranes moist, small lip blisters   Neck: Supple, midline trachea   Lungs:   Lungs clear; no dyspnea   CV:  Regular rate and rhythm,no murmur, click, rub or gallop   Abdomen:   Soft, non tender, active bowel sounds   Extremities: No cyanosis, trace pedal edema   Skin: Skin color, texture, turgor without acute rash; moist and warm   Musculoskeletal: No swelling or deformity   Lines/Devices:   Bright catheter with clear yellow urine   Psych: oriented, appropriate mood and affect given the setting       Data Review:     CBC:  Recent Labs     01/13/20  0503 01/12/20 0634 01/11/20 0713   WBC 13.9* 14.1* 12.4*   GRANS 69 74 78   MONOS 9 10 9   EOS 1 1 1   ANEU 9.5* 10.4* 9.7*   ABL 2.4 1.7 1.4   HGB 10.8* 10.9* 10.6*   HCT 31.0* 32.0* 31.7*    179 153       BMP:  Recent Labs     01/13/20  0503 01/12/20  0634 01/11/20 0713   CREA 0.98 1.10* 1.33*   BUN 13 18 19    136 137   K 3.4* 3.3* 3.6    101 103   CO2 28 28 26   AGAP 6* 7 8   GLU 86 90 79       LFTS:  Recent Labs     01/11/20 0713   TBILI 1.3*   ALT 33   SGOT 37      TP 5.8*   ALB 2.3*       Microbiology:     All Micro Results     Procedure Component Value Units Date/Time    CULTURE, BLOOD [[id number]] Collected:  01/07/20 1242    Order Status:  Completed Specimen:  Blood Updated:  01/12/20 0613     Special Requests: --        RIGHT  Antecubital       Culture result: NO GROWTH 5 DAYS       CULTURE, BLOOD [[id number]] Collected:  01/07/20 1249    Order Status:  Completed Specimen:  Blood Updated:  01/12/20 0613     Special Requests: --        LEFT  Antecubital       Culture result: NO GROWTH 5 DAYS       CULTURE, URINE [[id number]] Collected:  01/08/20 1745    Order Status:  Completed Specimen:  Urine from Bright Specimen Updated:  01/11/20 0749     Special Requests: GREY TUBE     Culture result: NO GROWTH 2 DAYS       CULTURE, URINE [[id number]] Collected:  01/07/20 1307    Order Status:  Completed Specimen:  Cystoscopic Urine Updated:  01/09/20 0808     Special Requests: NO SPECIAL REQUESTS        Culture result: NO GROWTH 2 DAYS       INFLUENZA A & B AG (RAPID TEST) [[id number]] Collected:  01/08/20 1306    Order Status:  Completed Specimen:  Nasopharyngeal from Nasal washing Updated:  01/08/20 1334     Influenza A Ag NEGATIVE         Comment: NEGATIVE FOR THE PRESENCE OF INFLUENZA A ANTIGEN  INFECTION DUE TO INFLUENZA A CANNOT BE RULED OUT. BECAUSE THE ANTIGEN PRESENT IN THE SAMPLE MAY BE BELOW  THE DETECTION LIMIT OF THE TEST. A NEGATIVE TEST IS PRESUMPTIVE AND IT IS RECOMMENDED THAT THESE RESULTS BE CONFIRMED BY VIRAL CULTURE OR AN FDA-CLEARED INFLUENZA A AND B MOLECULAR ASSAY. Influenza B Ag NEGATIVE         Comment: NEGATIVE FOR THE PRESENCE OF INFLUENZA B ANTIGEN  INFECTION DUE TO INFLUENZA B CANNOT BE RULED OUT. BECAUSE THE ANTIGEN PRESENT IN THE SAMPLE MAY BE BELOW  THE DETECTION LIMIT OF THE TEST. A NEGATIVE TEST IS PRESUMPTIVE AND IT IS RECOMMENDED THAT THESE RESULTS BE CONFIRMED BY VIRAL CULTURE OR AN FDA-CLEARED INFLUENZA A AND B MOLECULAR ASSAY. Source NASOPHARYNGEAL       CULTURE, BLOOD [[id number]]     Order Status:  Canceled Specimen:  Blood     CULTURE, BLOOD [[id number]]     Order Status:  Canceled Specimen:  Blood     CULTURE, URINE [[id number]]     Order Status:  Canceled Specimen:  Cath Urine           Imagin2020 CXR  IMPRESSION:  1. Worsening retrocardiac opacity at the left lung base. This may be atelectasis  or infection. 2. Pulmonary edema and small left greater than right pleural effusions have  worsened as well. 2020 CT Abd pelv wo cont  1. Interval placement of a left double pigtail ureteral stent which appears in  anatomic position. The small partially obstructing calculus in the left UPJ  appears relatively unchanged since prior. 2.  New small bilateral pleural effusions with probable acute mild pulmonary  edema. 3.  Other chronic findings as above.     Signed By: Miguel Balderas MD     2020

## 2020-01-14 ENCOUNTER — PATIENT OUTREACH (OUTPATIENT)
Dept: CASE MANAGEMENT | Age: 20
End: 2020-01-14

## 2020-01-14 NOTE — PROGRESS NOTES
This note will not be viewable in [address] Transition of Care Discharge Follow-up Questionnaire   Date/Time of Call:   01/14/2020   10:00am   What was the patient hospitalized for? Sepsis due to Urinary Tract Infection s/p Cystoscopy Left Ureteral Stent Insertion   Does the patient understand his/her diagnosis and/or treatment and what happened during the hospitalization? Yes, spoke with patients mom states understanding of diagnosis and treatment; and is agreeable to call. Patients mom states doing ok. Patients mom states she has had a slight fever that they have been treating with Tylenol. This Care Coordinator encouraged the mom to call Urology if fever persist. Patients mom verbalized understanding. Patient's mom states grateful for the call. Did the patient receive discharge instructions? Yes    CM Assessed Risk for Readmission:       Patient stated Risk for Readmission:      Moderate to High r/t diagnosis and/or comorbidities and/or treatments. Review any discharge instructions (see discharge instructions/AVS in [facility]). Ask patient if they understand these. Do they have any questions? Reviewed, understanding is stated, no questions at this time       Were home services ordered (nursing, PT, OT, ST, etc.)? No     If so, has the first visit occurred? If not, why? (Assist with coordination of services if necessary.)   N/A     Was any DME ordered? No   If so, has it been received? If not, why?  (Assist patient in obtaining DME orders &/or equipment if necessary.) N/A   Complete a review of all medications (new, continued and discontinued meds per the D/C instructions and medication tab in [facility]). Completed  START taking:  ciprofloxacin HCl 500 mg tablet (CIPRO)  oxyCODONE-acetaminophen 5-325 mg per  tablet (PERCOCET)  promethazine 12.5 mg tablet (PHENERGAN)  STOP taking:  HYDROcodone-acetaminophen 7.5-325 mg per  tablet [facility])   Were all new prescriptions filled?   If not, why?  (Assist patient in obtaining medications if necessary  escalate for CCM &/or SW if ongoing issues are verbalized by pt or anticipated)   yes   Does the patient understand the purpose and dosing instructions for all medications? (If patient has questions, provide explanation and education.)   Yes      Does the patient have any problems in performing ADLs? (If patient is unable to perform ADLs  what is the limiting factor(s)? Do they have a support system that can assist? If no support system is present, discuss possible assistance that they may be able to obtain. Escalate for CCM/SW if ongoing issues are verbalized by pt or anticipated)   Independent with ADLs at baseline. Does the patient have all follow-up appointments scheduled? 7 day f/up with PCP?   (f/up with PCP may be w/in 14 days if patient has a f/up with their specialist w/in 7 days)    7-14 day f/up with specialist?   (or per discharge instructions)    If f/up has not been made  what actions has the care coordinator made to accomplish this? Has transportation been arranged? Yes        Dr. Jayme Borja 1/20/2020        [facility] Urology office will call patient with an appointment date and time. Yes, no transportation needs at this time. Any other questions or concerns expressed by the patient? No other needs or concerns identified. Contact information for Care Coordinator was given, instructed to call with new questions or concerns. Schedule next appointment with PRIYA Barrios or refer to RN Case Manager/ per the workflow guidelines. When is care coordinators next follow-up call scheduled? If referred for CCM  what RN care manager was the referral assigned? Care Coordinator will follow per workflow guidelines.           Within 14 days   JULI Call Completed By: Saloni Hankins LPN  Care Coordinator

## 2020-01-20 ENCOUNTER — HOSPITAL ENCOUNTER (OUTPATIENT)
Dept: GENERAL RADIOLOGY | Age: 20
Discharge: HOME | End: 2020-01-20
Attending: PEDIATRICS
Payer: COMMERCIAL

## 2020-01-20 DIAGNOSIS — R05.9 COUGH: ICD-10-CM

## 2020-01-20 DIAGNOSIS — J90 PLEURAL EFFUSION: ICD-10-CM

## 2020-01-20 DIAGNOSIS — A41.9 SEPSIS (HCC): ICD-10-CM

## 2020-01-20 PROCEDURE — 71046 X-RAY EXAM CHEST 2 VIEWS: CPT

## 2020-01-27 ENCOUNTER — PATIENT OUTREACH (OUTPATIENT)
Dept: CASE MANAGEMENT | Age: 20
End: 2020-01-27

## 2020-01-27 NOTE — PROGRESS NOTES
This note will not be viewable in [address] Transitions of Care  Follow up Outreach Note   Outreach type Phone call: spoke with patient  Home visit:   Date/Time of Outreach: 1/27/2020 2:04 pm     Has patient attended PCP or specialist follow-up appointments since last contact? What was outcome of appointment? When is next follow-up scheduled? Patient states doing well. Patient reports that she has been pre-admitted for general surgery on 1/30/2020 for Ureteral Stone / Cystoscopy Left Ureteroscopy/Laser Litho/Stent exchange. Review medications. Any medication changes since last outreach? Does patient have any questions or issues related to their medications? None stated      Not at this time. Home health active? If yes  any issue? Progress? No       Referrals needed?  (CM, SW, HH, etc. )   No    Other issues/Miscellaneous? (Transportation, access to meals, ability to perform ADLs, adequate caregiver support, etc.) No other needs or concerns at this time. Patient states her gratitude for follow up. Next Outreach Scheduled?     Graduation from program?   N/A    Yes        Next Steps/Goals (if applicable):   N/A     Outreach completed by:   Maranda Mcdermott LPN  Care Coordinator

## 2020-01-29 ENCOUNTER — ANESTHESIA EVENT (OUTPATIENT)
Dept: SURGERY | Age: 20
End: 2020-01-29
Payer: COMMERCIAL

## 2020-01-30 ENCOUNTER — HOSPITAL ENCOUNTER (OUTPATIENT)
Age: 20
Setting detail: OUTPATIENT SURGERY
Discharge: HOME | End: 2020-01-30
Attending: UROLOGY | Admitting: UROLOGY
Payer: COMMERCIAL

## 2020-01-30 ENCOUNTER — ANESTHESIA (OUTPATIENT)
Dept: SURGERY | Age: 20
End: 2020-01-30
Payer: COMMERCIAL

## 2020-01-30 VITALS
TEMPERATURE: 97.5 F | DIASTOLIC BLOOD PRESSURE: 58 MMHG | WEIGHT: 103.5 LBS | BODY MASS INDEX: 20.32 KG/M2 | RESPIRATION RATE: 15 BRPM | HEIGHT: 60 IN | SYSTOLIC BLOOD PRESSURE: 101 MMHG | OXYGEN SATURATION: 96 % | HEART RATE: 76 BPM

## 2020-01-30 DIAGNOSIS — N20.1 LEFT URETERAL STONE: Primary | ICD-10-CM

## 2020-01-30 LAB
ALBUMIN SERPL-MCNC: 3.5 G/DL (ref 3.5–5)
ALBUMIN/GLOB SERPL: 0.8 {RATIO} (ref 1.2–3.5)
ALP SERPL-CCNC: 119 U/L (ref 50–136)
ALT SERPL-CCNC: 30 U/L (ref 12–65)
ANION GAP SERPL CALC-SCNC: 6 MMOL/L (ref 7–16)
APPEARANCE UR: ABNORMAL
AST SERPL-CCNC: 14 U/L (ref 15–37)
BACTERIA URNS QL MICRO: ABNORMAL /HPF
BILIRUB DIRECT SERPL-MCNC: 0.2 MG/DL
BILIRUB SERPL-MCNC: 0.8 MG/DL (ref 0.2–1.1)
BILIRUB UR QL: NEGATIVE
BUN SERPL-MCNC: 17 MG/DL (ref 6–23)
CALCIUM SERPL-MCNC: 9.6 MG/DL (ref 8.3–10.4)
CHLORIDE SERPL-SCNC: 108 MMOL/L (ref 98–107)
CO2 SERPL-SCNC: 26 MMOL/L (ref 21–32)
COLOR UR: YELLOW
CREAT SERPL-MCNC: 0.95 MG/DL (ref 0.6–1)
EPI CELLS #/AREA URNS HPF: ABNORMAL /HPF
ERYTHROCYTE [DISTWIDTH] IN BLOOD BY AUTOMATED COUNT: 11.8 % (ref 11.9–14.6)
GLOBULIN SER CALC-MCNC: 4.3 G/DL (ref 2.3–3.5)
GLUCOSE SERPL-MCNC: 82 MG/DL (ref 65–100)
GLUCOSE UR STRIP.AUTO-MCNC: NEGATIVE MG/DL
HCG UR QL: NEGATIVE
HCT VFR BLD AUTO: 32.9 % (ref 35.8–46.3)
HGB BLD-MCNC: 10.9 G/DL (ref 11.7–15.4)
HGB UR QL STRIP: ABNORMAL
KETONES UR QL STRIP.AUTO: NEGATIVE MG/DL
LEUKOCYTE ESTERASE UR QL STRIP.AUTO: ABNORMAL
MCH RBC QN AUTO: 30.9 PG (ref 26.1–32.9)
MCHC RBC AUTO-ENTMCNC: 33.1 G/DL (ref 31.4–35)
MCV RBC AUTO: 93.2 FL (ref 79.6–97.8)
NITRITE UR QL STRIP.AUTO: NEGATIVE
NRBC # BLD: 0 K/UL (ref 0–0.2)
PH UR STRIP: 6.5 [PH] (ref 5–9)
PLATELET # BLD AUTO: 308 K/UL (ref 150–450)
PMV BLD AUTO: 9.7 FL (ref 9.4–12.3)
POTASSIUM SERPL-SCNC: 3.8 MMOL/L (ref 3.5–5.1)
PROT SERPL-MCNC: 7.8 G/DL (ref 6.3–8.2)
PROT UR STRIP-MCNC: NEGATIVE MG/DL
RBC # BLD AUTO: 3.53 M/UL (ref 4.05–5.2)
RBC #/AREA URNS HPF: ABNORMAL /HPF
SODIUM SERPL-SCNC: 140 MMOL/L (ref 136–145)
SP GR UR REFRACTOMETRY: 1.02 (ref 1–1.02)
UROBILINOGEN UR QL STRIP.AUTO: 0.2 EU/DL (ref 0.2–1)
WBC # BLD AUTO: 10.5 K/UL (ref 4.3–11.1)
WBC URNS QL MICRO: ABNORMAL /HPF

## 2020-01-30 PROCEDURE — 74011250636 HC RX REV CODE- 250/636: Performed by: UROLOGY

## 2020-01-30 PROCEDURE — 81001 URINALYSIS AUTO W/SCOPE: CPT

## 2020-01-30 PROCEDURE — 76010000160 HC OR TIME 0.5 TO 1 HR INTENSV-TIER 1: Performed by: UROLOGY

## 2020-01-30 PROCEDURE — 77030020463 HC FCPS ENDOSC STN BSC -C: Performed by: UROLOGY

## 2020-01-30 PROCEDURE — 77030018832 HC SOL IRR H20 ICUM -A: Performed by: UROLOGY

## 2020-01-30 PROCEDURE — 80048 BASIC METABOLIC PNL TOTAL CA: CPT

## 2020-01-30 PROCEDURE — 80076 HEPATIC FUNCTION PANEL: CPT

## 2020-01-30 PROCEDURE — 74011250636 HC RX REV CODE- 250/636: Performed by: NURSE ANESTHETIST, CERTIFIED REGISTERED

## 2020-01-30 PROCEDURE — 74011000250 HC RX REV CODE- 250: Performed by: NURSE ANESTHETIST, CERTIFIED REGISTERED

## 2020-01-30 PROCEDURE — 76060000032 HC ANESTHESIA 0.5 TO 1 HR: Performed by: UROLOGY

## 2020-01-30 PROCEDURE — 77030019927 HC TBNG IRR CYSTO BAXT -A: Performed by: UROLOGY

## 2020-01-30 PROCEDURE — C1769 GUIDE WIRE: HCPCS | Performed by: UROLOGY

## 2020-01-30 PROCEDURE — C2617 STENT, NON-COR, TEM W/O DEL: HCPCS | Performed by: UROLOGY

## 2020-01-30 PROCEDURE — 74011636320 HC RX REV CODE- 636/320: Performed by: UROLOGY

## 2020-01-30 PROCEDURE — 82355 CALCULUS ANALYSIS QUAL: CPT

## 2020-01-30 PROCEDURE — 76210000006 HC OR PH I REC 0.5 TO 1 HR: Performed by: UROLOGY

## 2020-01-30 PROCEDURE — 77030010509 HC AIRWY LMA MSK TELE -A: Performed by: ANESTHESIOLOGY

## 2020-01-30 PROCEDURE — 76210000021 HC REC RM PH II 0.5 TO 1 HR: Performed by: UROLOGY

## 2020-01-30 PROCEDURE — 77030040361 HC SLV COMPR DVT MDII -B: Performed by: UROLOGY

## 2020-01-30 PROCEDURE — 74011250636 HC RX REV CODE- 250/636: Performed by: ANESTHESIOLOGY

## 2020-01-30 PROCEDURE — 85027 COMPLETE CBC AUTOMATED: CPT

## 2020-01-30 PROCEDURE — 88300 SURGICAL PATH GROSS: CPT

## 2020-01-30 PROCEDURE — 77030018846 HC SOL IRR STRL H20 ICUM -A: Performed by: UROLOGY

## 2020-01-30 PROCEDURE — 81025 URINE PREGNANCY TEST: CPT

## 2020-01-30 DEVICE — URETERAL STENT
Type: IMPLANTABLE DEVICE | Site: URETER | Status: FUNCTIONAL
Brand: PERCUFLEX™ PLUS

## 2020-01-30 RX ORDER — MIDAZOLAM HYDROCHLORIDE 1 MG/ML
2 INJECTION, SOLUTION INTRAMUSCULAR; INTRAVENOUS
Status: DISCONTINUED | OUTPATIENT
Start: 2020-01-30 | End: 2020-01-30 | Stop reason: HOSPADM

## 2020-01-30 RX ORDER — HYDROCODONE BITARTRATE AND ACETAMINOPHEN 5; 325 MG/1; MG/1
1 TABLET ORAL
Qty: 15 TAB | Refills: 0 | Status: SHIPPED | OUTPATIENT
Start: 2020-01-30 | End: 2020-02-06

## 2020-01-30 RX ORDER — CEFAZOLIN SODIUM/WATER 2 G/20 ML
2 SYRINGE (ML) INTRAVENOUS
Status: COMPLETED | OUTPATIENT
Start: 2020-01-30 | End: 2020-01-30

## 2020-01-30 RX ORDER — PROPOFOL 10 MG/ML
INJECTION, EMULSION INTRAVENOUS AS NEEDED
Status: DISCONTINUED | OUTPATIENT
Start: 2020-01-30 | End: 2020-01-30 | Stop reason: HOSPADM

## 2020-01-30 RX ORDER — ONDANSETRON 2 MG/ML
INJECTION INTRAMUSCULAR; INTRAVENOUS AS NEEDED
Status: DISCONTINUED | OUTPATIENT
Start: 2020-01-30 | End: 2020-01-30 | Stop reason: HOSPADM

## 2020-01-30 RX ORDER — MIDAZOLAM HYDROCHLORIDE 1 MG/ML
2 INJECTION, SOLUTION INTRAMUSCULAR; INTRAVENOUS ONCE
Status: DISCONTINUED | OUTPATIENT
Start: 2020-01-30 | End: 2020-01-30 | Stop reason: HOSPADM

## 2020-01-30 RX ORDER — DEXAMETHASONE SODIUM PHOSPHATE 4 MG/ML
INJECTION, SOLUTION INTRA-ARTICULAR; INTRALESIONAL; INTRAMUSCULAR; INTRAVENOUS; SOFT TISSUE AS NEEDED
Status: DISCONTINUED | OUTPATIENT
Start: 2020-01-30 | End: 2020-01-30 | Stop reason: HOSPADM

## 2020-01-30 RX ORDER — PROPOFOL 10 MG/ML
INJECTION, EMULSION INTRAVENOUS AS NEEDED
Status: DISCONTINUED | OUTPATIENT
Start: 2020-01-30 | End: 2020-01-30

## 2020-01-30 RX ORDER — LIDOCAINE HYDROCHLORIDE 20 MG/ML
INJECTION, SOLUTION EPIDURAL; INFILTRATION; INTRACAUDAL; PERINEURAL AS NEEDED
Status: DISCONTINUED | OUTPATIENT
Start: 2020-01-30 | End: 2020-01-30 | Stop reason: HOSPADM

## 2020-01-30 RX ORDER — OXYCODONE HYDROCHLORIDE 5 MG/1
5 TABLET ORAL
Status: DISCONTINUED | OUTPATIENT
Start: 2020-01-30 | End: 2020-01-30 | Stop reason: HOSPADM

## 2020-01-30 RX ORDER — MIDAZOLAM HYDROCHLORIDE 1 MG/ML
INJECTION, SOLUTION INTRAMUSCULAR; INTRAVENOUS AS NEEDED
Status: DISCONTINUED | OUTPATIENT
Start: 2020-01-30 | End: 2020-01-30 | Stop reason: HOSPADM

## 2020-01-30 RX ORDER — FENTANYL CITRATE 50 UG/ML
100 INJECTION, SOLUTION INTRAMUSCULAR; INTRAVENOUS ONCE
Status: DISCONTINUED | OUTPATIENT
Start: 2020-01-30 | End: 2020-01-30 | Stop reason: HOSPADM

## 2020-01-30 RX ORDER — PHENAZOPYRIDINE HYDROCHLORIDE 200 MG/1
200 TABLET, FILM COATED ORAL
Qty: 9 TAB | Refills: 0 | Status: SHIPPED | OUTPATIENT
Start: 2020-01-30 | End: 2020-02-02

## 2020-01-30 RX ORDER — CEFDINIR 300 MG/1
300 CAPSULE ORAL 2 TIMES DAILY
COMMUNITY

## 2020-01-30 RX ORDER — NALOXONE HYDROCHLORIDE 0.4 MG/ML
0.04 INJECTION, SOLUTION INTRAMUSCULAR; INTRAVENOUS; SUBCUTANEOUS
Status: DISCONTINUED | OUTPATIENT
Start: 2020-01-30 | End: 2020-01-30 | Stop reason: HOSPADM

## 2020-01-30 RX ORDER — FENTANYL CITRATE 50 UG/ML
INJECTION, SOLUTION INTRAMUSCULAR; INTRAVENOUS AS NEEDED
Status: DISCONTINUED | OUTPATIENT
Start: 2020-01-30 | End: 2020-01-30 | Stop reason: HOSPADM

## 2020-01-30 RX ORDER — HYDROMORPHONE HYDROCHLORIDE 2 MG/ML
0.5 INJECTION, SOLUTION INTRAMUSCULAR; INTRAVENOUS; SUBCUTANEOUS
Status: DISCONTINUED | OUTPATIENT
Start: 2020-01-30 | End: 2020-01-30 | Stop reason: HOSPADM

## 2020-01-30 RX ORDER — HYOSCYAMINE SULFATE 0.12 MG/1
0.12 TABLET SUBLINGUAL
Qty: 30 TAB | Refills: 1 | Status: SHIPPED | OUTPATIENT
Start: 2020-01-30

## 2020-01-30 RX ORDER — SODIUM CHLORIDE, SODIUM LACTATE, POTASSIUM CHLORIDE, CALCIUM CHLORIDE 600; 310; 30; 20 MG/100ML; MG/100ML; MG/100ML; MG/100ML
100 INJECTION, SOLUTION INTRAVENOUS CONTINUOUS
Status: DISCONTINUED | OUTPATIENT
Start: 2020-01-30 | End: 2020-01-30 | Stop reason: HOSPADM

## 2020-01-30 RX ORDER — LIDOCAINE HYDROCHLORIDE 10 MG/ML
0.1 INJECTION INFILTRATION; PERINEURAL AS NEEDED
Status: DISCONTINUED | OUTPATIENT
Start: 2020-01-30 | End: 2020-01-30 | Stop reason: HOSPADM

## 2020-01-30 RX ORDER — KETOROLAC TROMETHAMINE 30 MG/ML
INJECTION, SOLUTION INTRAMUSCULAR; INTRAVENOUS AS NEEDED
Status: DISCONTINUED | OUTPATIENT
Start: 2020-01-30 | End: 2020-01-30 | Stop reason: HOSPADM

## 2020-01-30 RX ADMIN — FENTANYL CITRATE 50 MCG: 50 INJECTION INTRAMUSCULAR; INTRAVENOUS at 10:07

## 2020-01-30 RX ADMIN — ONDANSETRON 4 MG: 2 INJECTION INTRAMUSCULAR; INTRAVENOUS at 10:01

## 2020-01-30 RX ADMIN — MIDAZOLAM HYDROCHLORIDE 2 MG: 2 INJECTION, SOLUTION INTRAMUSCULAR; INTRAVENOUS at 09:43

## 2020-01-30 RX ADMIN — LIDOCAINE HYDROCHLORIDE 60 MG: 20 INJECTION, SOLUTION EPIDURAL; INFILTRATION; INTRACAUDAL; PERINEURAL at 09:53

## 2020-01-30 RX ADMIN — PROPOFOL 150 MG: 10 INJECTION, EMULSION INTRAVENOUS at 09:53

## 2020-01-30 RX ADMIN — Medication 2 G: at 09:59

## 2020-01-30 RX ADMIN — DEXAMETHASONE SODIUM PHOSPHATE 10 MG: 4 INJECTION, SOLUTION INTRAMUSCULAR; INTRAVENOUS at 10:01

## 2020-01-30 RX ADMIN — FENTANYL CITRATE 50 MCG: 50 INJECTION INTRAMUSCULAR; INTRAVENOUS at 10:05

## 2020-01-30 RX ADMIN — KETOROLAC TROMETHAMINE 30 MG: 30 INJECTION, SOLUTION INTRAMUSCULAR; INTRAVENOUS at 10:15

## 2020-01-30 RX ADMIN — SODIUM CHLORIDE, SODIUM LACTATE, POTASSIUM CHLORIDE, AND CALCIUM CHLORIDE 100 ML/HR: 600; 310; 30; 20 INJECTION, SOLUTION INTRAVENOUS at 08:28

## 2020-01-30 NOTE — DISCHARGE INSTRUCTIONS
Patient Education        Kidney Stone: Care Instructions  Your Care Instructions    Kidney stones are formed when salts, minerals, and other substances normally found in the urine clump together. They can be as small as grains of sand or, rarely, as large as golf balls. While the stone is traveling through the ureter, which is the tube that carries urine from the kidney to the bladder, you will probably feel pain. The pain may be mild or very severe. You may also have some blood in your urine. As soon as the stone reaches the bladder, any intense pain should go away. If a stone is too large to pass on its own, you may need a medical procedure to help you pass the stone. The doctor has checked you carefully, but problems can develop later. If you notice any problems or new symptoms, get medical treatment right away. Follow-up care is a key part of your treatment and safety. Be sure to make and go to all appointments, and call your doctor if you are having problems. It's also a good idea to know your test results and keep a list of the medicines you take. How can you care for yourself at home? · Drink plenty of fluids, enough so that your urine is light yellow or clear like water. If you have kidney, heart, or liver disease and have to limit fluids, talk with your doctor before you increase the amount of fluids you drink. · Take pain medicines exactly as directed. Call your doctor if you think you are having a problem with your medicine. ? If the doctor gave you a prescription medicine for pain, take it as prescribed. ? If you are not taking a prescription pain medicine, ask your doctor if you can take an over-the-counter medicine. Read and follow all instructions on the label. · Your doctor may ask you to strain your urine so that you can collect your kidney stone when it passes. You can use a kitchen strainer or a tea strainer to catch the stone.  Store it in a plastic bag until you see your doctor again.  Preventing future kidney stones  Some changes in your diet may help prevent kidney stones. Depending on the cause of your stones, your doctor may recommend that you:  · Drink plenty of fluids, enough so that your urine is light yellow or clear like water. If you have kidney, heart, or liver disease and have to limit fluids, talk with your doctor before you increase the amount of fluids you drink. · Limit coffee, tea, and alcohol. Also avoid grapefruit juice. · Do not take more than the recommended daily dose of vitamins C and D.  · Avoid antacids such as Gaviscon, Maalox, Mylanta, or Tums. · Limit the amount of salt (sodium) in your diet. · Eat a balanced diet that is not too high in protein. · Limit foods that are high in a substance called oxalate, which can cause kidney stones. These foods include dark green vegetables, rhubarb, chocolate, wheat bran, nuts, cranberries, and beans. When should you call for help? Call your doctor now or seek immediate medical care if:    · You cannot keep down fluids.     · Your pain gets worse.     · You have a fever or chills.     · You have new or worse pain in your back just below your rib cage (the flank area).     · You have new or more blood in your urine.    Watch closely for changes in your health, and be sure to contact your doctor if:    · You do not get better as expected. Where can you learn more? Go to http://martha-jose j.info/. Enter N397 in the search box to learn more about \"Kidney Stone: Care Instructions. \"  Current as of: October 31, 2018  Content Version: 12.2  © 5015-0032 [employer] [place]. Care instructions adapted under license by [employer] (which disclaims liability or warranty for this information).  If you have questions about a medical condition or this instruction, always ask your healthcare professional. [address] any warranty or liability for your use of this information. Patient Education        Laser Lithotripsy: What to Expect at [address] lithotripsy is a way to treat kidney stones. This treatment uses a laser to break kidney stones into tiny pieces. For several hours after the procedure you may have a burning feeling when you urinate. You may feel the urge to go even if you don't need to. This feeling should go away within a day. Drinking a lot of water can help. Your doctor also may advise you to take medicine that numbs the burning. This medicine is called phenazopyridine. It is available by prescription and over the counter. Brand names include Pyridium and Uristat. Your doctor may prescribe an antibiotic. This will help prevent an infection. You may have some blood in your urine for 2 or 3 days. Your doctor may have placed a small tube inside one of your ureters. Ureters are the tubes that connect the kidneys to the bladder. The small tube the doctor may have placed is called a stent. It may help the stone fragments pass through your body. Your doctor may remove the stent in a few weeks. Most stone fragments that are not removed pass out of the body within 24 hours. But sometimes it can take many weeks. If you have a large stone, you may need to come back for more treatments. This care sheet gives you a general idea about how long it will take for you to recover. But each person recovers at a different pace. Follow the steps below to feel better as quickly as possible. How can you care for yourself at home? Activity    · Rest as much as you need to after you go home.     · You may do your regular activities. But avoid hard exercise or sports for about a week or until there is no blood in your urine. Diet    · You can eat your normal diet after lithotripsy.     · Continue to drink plenty of fluids, enough so that your urine is light yellow or clear like water.  If you have kidney, heart, or liver disease and have to limit fluids, talk with your doctor before you increase the amount of fluids you drink. Medicines    Your doctor will tell you if and when you can restart your medicines. He or she will also give you instructions about taking any   · If you take blood thinners, such as warfarin (Coumadin), clopidogrel (Plavix), or aspirin, be sure to talk to your doctor. He or she will tell you if and when to start taking those medicines again. Make sure that you understand exactly what your doctor wants you to do.     · If you take medicine to stop the burning when you urinate, take it exactly as recommended. Call your doctor if you think you are having a problem with your medicine. This medicine may color your urine orange or red. This is normal. You will get more details on the specific medicine your doctor recommends.     · If your doctor prescribed antibiotics, take them as directed. Do not stop taking them just because you feel better. You need to take the full course of antibiotics.     · Be safe with medicines. Read and follow all instructions on the label. ? If the doctor gave you a prescription medicine for pain, take it as prescribed. ? If you are not taking a prescription pain medicine, ask your doctor if you can take acetaminophen (Tylenol). Do not take ibuprofen (Advil, Motrin) or naproxen (Aleve) or similar medicines unless your doctor tells you to. ? Do not take two or more pain medicines at the same time unless the doctor told you to. Many pain medicines have acetaminophen, which is Tylenol. Too much acetaminophen (Tylenol) can be harmful.    Heat    · Take a warm bath. This may soothe the burning. Other instructions    · Urinate through the strainer the doctor gives you. Save any stone pieces, including those that look like sand or gravel. Take these to your doctor. This will help your doctor find the cause of your stones. Follow-up care is a key part of your treatment and safety.  Be sure to make and go to all appointments, and call your doctor if you are having problems. It's also a good idea to know your test results and keep a list of the medicines you take. When should you call for help? Call 911 anytime you think you may need emergency care. For example, call if:    · You passed out (lost consciousness).     · You have chest pain, are short of breath, or cough up blood.    Call your doctor now or seek immediate medical care if:    · You have pain that does not get better after you take pain medicine.     · You have new or more blood clots in your urine. (It is normal for the urine to be pink for a few days.)     · You cannot urinate.     · You have symptoms of a urinary tract infection. These may include:  ? Pain or burning when you urinate. ? A frequent need to urinate without being able to pass much urine. ? Pain in the flank, which is just below the rib cage and above the waist on either side of the back. ? Blood in the urine. ? A fever.     · You are sick to your stomach or cannot drink fluids.     · You have signs of a blood clot in your leg (called a deep vein thrombosis), such as:  ? Pain in the calf, back of the knee, thigh, or groin. ? Redness and swelling in your leg.    Watch closely for any changes in your health, and be sure to contact your doctor if you have any problems. Where can you learn more? Go to http://amrtha-jose j.info/. Enter Q239 in the search box to learn more about \"Laser Lithotripsy: What to Expect at Home. \"  Current as of: October 31, 2018  Content Version: 12.2  © 1703-4272 [employer]. Care instructions adapted under license by [employer] (which disclaims liability or warranty for this information). If you have questions about a medical condition or this instruction, always ask your healthcare professional. [place] [address] any warranty or liability for your use of this information.          Patient Education Learning About Diet for Kidney Stone Prevention  What are kidney stones? Kidney stones are made of salts and minerals in the urine that form small \"michael. \" Stones can form in the kidneys and the ureters (the tubes that lead from the kidneys to the bladder). They can also form in the bladder. Stones may not cause a problem as long as they stay in the kidneys. But they can cause sudden, severe pain. Pain is most likely when the stones travel from the kidneys to the bladder. Kidney stones can cause bloody urine. Kidney stones often run in families. You are more likely to get them if you don't drink enough fluids, mainly water. Certain foods and drinks and some dietary supplements may also increase your risk for kidney stones if you consume too much of them. What can you do to prevent kidney stones? Changing what you eat may not prevent all types of kidney stones. But for people who have a history of certain kinds of kidney stones, some changes in diet may help. A dietitian can help you set up a meal plan that includes healthy, low-oxalate choices. Here are some general guidelines to get you started. Plan your meals and snacks around foods that are low in oxalate. These foods include:  · Corn, kale, parsnips, and squash,. · Beef, chicken, pork, turkey, and fish. · Milk, butter, cheese, and yogurt. You can eat certain foods that are medium-high in oxalate, but eat them only once in a while. These foods include:  · Bread. · Brown rice. · English muffins. · Figs. · Popcorn. · String beans. · Tomatoes. Limit very high-oxalate foods, including:  · Black tea. · Coffee. · Chocolate. · Dark green vegetables. · Nuts. Here are some other things you can do to help prevent kidney stones. · Drink plenty of fluids. If you have kidney, heart, or liver disease and have to limit fluids, talk with your doctor before you increase the amount of fluids you drink.   · Do not take more than the recommended daily dose of vitamins C and D.  · Limit the salt in your diet. · Eat a balanced diet that is not too high in protein. Follow-up care is a key part of your treatment and safety. Be sure to make and go to all appointments, and call your doctor if you are having problems. It's also a good idea to know your test results and keep a list of the medicines you take. Where can you learn more? Go to http://martha-jose j.info/. Enter C138 in the search box to learn more about \"Learning About Diet for Kidney Stone Prevention. \"  Current as of: November 7, 2018  Content Version: 12.2  © 7564-4426 [email or web address]. Care instructions adapted under license by [employer] (which disclaims liability or warranty for this information). If you have questions about a medical condition or this instruction, always ask your healthcare professional. [address] any warranty or liability for your use of this information. Ureteral Stent Placement: What to Expect at [address]  A ureteral (say \"you-REE-ter-\") stent is a thin, hollow tube that is placed in the ureter to help urine pass from the kidney into the bladder. Ureters are the tubes that connect the kidneys to the bladder. You may have a small amount of blood in your urine for 1 to 3 days after the procedure. While the stent is in place, you may have to urinate more often, feel a sudden need to urinate, or feel like you cannot completely empty your bladder. You may feel some pain when you urinate or do strenuous activity. You also may notice a small amount of blood in your urine after strenuous activities. These side effects usually do not prevent people from doing their normal daily activities. You may have a string attached to your stent. Do not to pull the string unless the doctor tells you to. The doctor will use the string to pull out the stent when you no longer need it.   After the procedure, urine may flow better from your kidneys to your bladder. A ureteral stent may be left in place for several days or for as long as several months. Your doctor will take it out when you no longer need it. Cystoscopy: What to Expect at [address]  A cystoscopy is a procedure that lets a doctor look inside of the bladder and the urethra. The urethra is the tube that carries urine from the bladder to outside the body. The doctor uses a thin, lighted tube called a cystoscope to look for kidney or bladder stones, tumors, bleeding, or infection. After the cystoscopy, your urethra may be sore at first, and it may burn when you urinate for the first few days after the procedure. You may feel the need to urinate more often, and your urine may be pink. These symptoms should get better in 1 or 2 days. You will probably be able to go back to work or most of your usual activities in 1 or 2 days. How can you care for yourself at home? Activity  · Rest when you feel tired. Getting enough sleep will help you recover. · Try to walk each day. Start by walking a little more than you did the day before. Bit by bit, increase the amount you walk. Walking boosts blood flow and helps prevent pneumonia and constipation. · Avoid strenuous activities, such as bicycle riding, jogging, weight lifting, or aerobic exercise, until your doctor says it is okay. · Ask your doctor when you can drive again. · Most people are able to return to work within 1 or 2 days after the procedure. · You may shower and take baths as usual.  · Ask your doctor when it is okay for you to have sex. Diet  · You can eat your normal diet. If your stomach is upset, try bland, low-fat foods like plain rice, broiled chicken, toast, and yogurt. · Drink plenty of fluids (unless your doctor tells you not to). Medicines  · Take pain medicines exactly as directed. ¨ If the doctor gave you a prescription medicine for pain, take it as prescribed.   ¨ If you are not taking a prescription pain medicine, ask your doctor if you can take an over-the-counter medicine. · If you think your pain medicine is making you sick to your stomach:  ¨ Take your medicine after meals (unless your doctor has told you not to). ¨ Ask your doctor for a different pain medicine. · If your doctor prescribed antibiotics, take them as directed. Do not stop taking them just because you feel better. You need to take the full course of antibiotics. Follow-up care is a key part of your treatment and safety. Be sure to make and go to all appointments, and call your doctor if you are having problems. It's also a good idea to know your test results and keep a list of the medicines you take. When should you call for help? Call 911 anytime you think you may need emergency care. For example, call if:  · You passed out (lost consciousness). · You have severe trouble breathing. · You have sudden chest pain and shortness of breath, or you cough up blood. · You have severe belly pain. Call your doctor now or seek immediate medical care if:  · You are sick to your stomach or cannot keep fluids down. · Your urine is still red or you see blood clots after you have urinated several times. · You have trouble passing urine or stool, especially if you have pain or swelling in your lower belly. · You have signs of a blood clot, such as:  ¨ Pain in your calf, back of the knee, thigh, or groin. ¨ Redness and swelling in your leg or groin. · You develop a fever or severe chills. · You have pain in your back just below your rib cage. This is called flank pain. Watch closely for changes in your health, and be sure to contact your doctor if:  · A burning feeling is normal for a day or two after the test, but call if it does not get better. · You have a frequent urge to urinate but can pass only small amounts of urine.    · It is normal for the urine to have a pinkish color for a few days after the test, but call if it does not get better or if your urine is cloudy, smells bad, or has pus in it. After general anesthesia or intravenous sedation, for 24 hours or while taking prescription Narcotics:  · Limit your activities  · A responsible adult needs to be with you for the next 24 hours  · Do not drive and operate hazardous machinery  · Do not make important personal or business decisions  · Do  not drink alcoholic beverages  · If you have not urinated within 8 hours after discharge, please contact your surgeon on call. · If you have sleep apnea and have a CPAP machine, please use it for all naps and sleeping. · Please use caution when taking narcotics and any of your home medications that may cause drowsiness. *  Please give a list of your current medications to your Primary Care Provider. *  Please update this list whenever your medications are discontinued, doses are      changed, or new medications (including over-the-counter products) are added. *  Please carry medication information at all times in case of emergency situations. These are general instructions for a healthy lifestyle:  No smoking/ No tobacco products/ Avoid exposure to second hand smoke  Surgeon General's Warning:  Quitting smoking now greatly reduces serious risk to your health. Obesity, smoking, and sedentary lifestyle greatly increases your risk for illness  A healthy diet, regular physical exercise & weight monitoring are important for maintaining a healthy lifestyle    You may be retaining fluid if you have a history of heart failure or if you experience any of the following symptoms:  Weight gain of 3 pounds or more overnight or 5 pounds in a week, increased swelling in our hands or feet or shortness of breath while lying flat in bed. Please call your doctor as soon as you notice any of these symptoms; do not wait until your next office visit.

## 2020-01-30 NOTE — H&P
[address] Urology H&P Note                                           01/30/20     Patient: Hui Mcdonald  MRN: [id number]    Admission Date:  1/30/2020, 0  Admission Diagnosis: Ureteral stone [N20.1]    ASSESSMENT: 20 y.o. female with a history of urosepsis secondary to 4 mm L proximal ureteral stone s/p L ureteral stent placement in 12/2019 who presents today for removal and treatment of the stone. PLAN:  -To OR for Cystoscopy, LEFT Ureteroscopy, Laser Lithotripsy, LEFT Ureteral Stent Exchange  -Consented  -Antibiotics on call to OR  -NPO for surgery. __________________________________________________________________________________    HPI:     Hui Mcdonald is a 20 y.o. female with a history of urosepsis secondary to 4 mm L proximal ureteral stone s/p L ureteral stent placement in 12/2019 who presents today for removal and treatment of the stone after her infection has resolved. She has now recovered from her acute urosepsis and is doing well. NPO for surgery today. No recent fever/chills. Past Medical History:  Past Medical History:   Diagnosis Date    Elevated liver enzymes     Kidney stone        Past Surgical History:  Past Surgical History:   Procedure Laterality Date    HX LAP CHOLECYSTECTOMY      HX WISDOM TEETH EXTRACTION         Medications:  No current facility-administered medications on file prior to encounter. Current Outpatient Medications on File Prior to Encounter   Medication Sig Dispense Refill    cefdinir (OMNICEF) 300 mg capsule Take 300 mg by mouth two (2) times a day. Allergies: Allergies   Allergen Reactions    Vancomycin Other (comments)     Possible Red Man Syndrome on 1/8/2020 from vancomycin 1000 mg given over 1 hour. Need to administer vancomycin doses more slowly than normal to avoid reaction in the future.   Note, this is NOT a true allergy, just a reaction caused by histamine release when vancomycin infused too rapidly. If it becomes an issue with subsequent doses, consider pre-treatment with benadryl before vancomycin doses.        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   Social Needs    Financial resource strain: Not on file    Food insecurity:     Worry: Not on file     Inability: Not on file   IdentiGEN needs:     Medical: Not on file     Non-medical: Not on file   Tobacco Use    Smoking status: Never Smoker    Smokeless tobacco: Never Used   Substance and Sexual Activity    Alcohol use: Never     Frequency: Never    Drug use: Never    Sexual activity: Not on file   Lifestyle    Physical activity:     Days per week: Not on file     Minutes per session: Not on file    Stress: Not on file   Relationships    Social connections:     Talks on phone: Not on file     Gets together: Not on file     Attends Restorationist service: Not on file     Active member of club or organization: Not on file     Attends meetings of clubs or organizations: Not on file     Relationship status: Not on file    Intimate partner violence:     Fear of current or ex partner: Not on file     Emotionally abused: Not on file     Physically abused: Not on file     Forced sexual activity: Not on file   Other Topics Concern    Not on file   Social History Narrative    Lives with her parents       Family History:  Family History   Problem Relation Age of Onset    No Known Problems Mother     No Known Problems Father     No Known Problems Brother        ROS:  Review of Systems - General ROS: negative for - chills, fatigue or fever  Respiratory ROS: no cough, shortness of breath, or wheezing  Cardiovascular ROS: no chest pain or dyspnea on exertion  Gastrointestinal ROS: no abdominal pain, change in bowel habits, or black or bloody stools  Musculoskeletal ROS: negative  negative for - muscular weakness    Vitals:  Visit Vitals  /71 (BP 1 Location: Right arm, BP Patient Position: At rest)   Pulse 91   Temp 98.1 °F (36.7 °C)   Resp 18   Ht 5' (1.524 m)   Wt 103 lb 8 oz (46.9 kg)   SpO2 99%   BMI 20.21 kg/m²       Intake/Output:  No intake or output data in the 24 hours ending 01/30/20 0859     Physical Exam:   Visit Vitals  /71 (BP 1 Location: Right arm, BP Patient Position: At rest)   Pulse 91   Temp 98.1 °F (36.7 °C)   Resp 18   Ht 5' (1.524 m)   Wt 103 lb 8 oz (46.9 kg)   SpO2 99%   BMI 20.21 kg/m²        GENERAL: No acute distress, Awake, Alert, Oriented X 3  CARDIAC: regular rate and rhythm  CHEST AND LUNG: Easy work of breathing  ABDOMEN: soft, non tender, non-distended  SKIN: No rash, no erythema, no lacerations or abrasions, no ecchymosis  NEUROLOGIC: cranial nerves 2-12 grossly intact     Lab/Radiology/Other Diagnostic Tests:  Recent Labs     01/30/20  0844   HGB 10.9*   HCT 32.9*   WBC 10.5         Sorin Riddle M.D.     [facility]  [facility]  [address] [place] [address] [address]  Phone: [phone]  Fax: [phone]

## 2020-01-30 NOTE — ANESTHESIA POSTPROCEDURE EVALUATION
Procedure(s):  CYSTOSCOPY LEFT  URETEROSCOPY/LEFT RETROGRADE PYELOGRAM//STENT EXCHANGE. general    Anesthesia Post Evaluation      Multimodal analgesia: multimodal analgesia used between 6 hours prior to anesthesia start to PACU discharge  Patient location during evaluation: PACU  Patient participation: complete - patient participated  Level of consciousness: awake and awake and alert  Pain management: adequate  Airway patency: patent  Anesthetic complications: no  Cardiovascular status: acceptable  Respiratory status: acceptable  Hydration status: acceptable  Post anesthesia nausea and vomiting:  controlled      Vitals Value Taken Time   BP 99/57 1/30/2020 11:06 AM   Temp 36.2 °C (97.2 °F) 1/30/2020 10:33 AM   Pulse 68 1/30/2020 11:08 AM   Resp 15 1/30/2020 10:51 AM   SpO2 96 % 1/30/2020 11:08 AM   Vitals shown include unvalidated device data.

## 2020-01-30 NOTE — ANESTHESIA PREPROCEDURE EVALUATION
Relevant Problems   No relevant active problems       Anesthetic History   No history of anesthetic complications            Review of Systems / Medical History  Patient summary reviewed and pertinent labs reviewed    Pulmonary  Within defined limits                 Neuro/Psych   Within defined limits           Cardiovascular  Within defined limits                Exercise tolerance: >4 METS  Comments: Denies recent CP, SOB or Palpitations   GI/Hepatic/Renal               Comments: Urosepsis from kidney stone Endo/Other  Within defined limits           Other Findings              Physical Exam    Airway  Mallampati: II  TM Distance: 4 - 6 cm  Neck ROM: normal range of motion   Mouth opening: Normal     Cardiovascular      Rate: abnormal        Comments: tachycardic Dental  No notable dental hx       Pulmonary  Breath sounds clear to auscultation               Abdominal  GI exam deferred       Other Findings            Anesthetic Plan    ASA: 2  Anesthesia type: general          Induction: Intravenous and RSI  Anesthetic plan and risks discussed with: Patient

## 2020-01-30 NOTE — BRIEF OP NOTE
BRIEF OPERATIVE NOTE    Date of Procedure: 1/30/2020   Preoperative Diagnosis: Ureteral stone [N20.1]  Postoperative Diagnosis: left Ureteral stone [N20.1]    Procedure(s):  CYSTOSCOPY LEFT  URETEROSCOPY/LEFT RETROGRADE PYELOGRAM//STENT EXCHANGE  Surgeon(s) and Role:     * Diane Taveras MD - Primary         Surgical Assistant: None    Surgical Staff:  Circ-1: Ebenezer Pacheco RN  Scrub Tech-1: Mary PENA  Event Time In Time Out   Incision Start 1004    Incision Close 1024      Anesthesia: General   Estimated Blood Loss: 1 cc  Specimens:   ID Type Source Tests Collected by Time Destination   1 : left ureteral stone Fresh Ureter Left  Diane Taveras MD 1/30/2020 1011 Pathology      Findings: 4 mm proximal L ureteral stone basketed and removed. Normal left retrograde pyelogram      Complications: None    Implants:   Implant Name Type Inv.  Item Serial No.  Lot No. LRB No. Used Action   STENT URET 6F 24CM - [id number]  STENT URET 6F 24CM  [facility] [id number] Left 1 Implanted

## 2020-01-30 NOTE — OP NOTES
[address] [facility]  OPERATIVE REPORT    Name:  Fanny Powell  MR#:  [id number]  :  2000  ACCOUNT #:  [de-identified]  DATE OF SERVICE:  2020    PREOPERATIVE DIAGNOSIS:  Left ureter stone. POSTOPERATIVE DIAGNOSIS:  Left ureter stone. PROCEDURE PERFORMED:  Cystoscopy, left ureteroscopy, left retrograde pyelogram, stent exchange, basket extraction of stone. SURGEON:  Christopher Castro MD    ASSISTANT:  None. ANESTHESIA:  General.    COMPLICATIONS:  None. SPECIMENS REMOVED:  Left ureter stone. IMPLANTS:  A 6 x 24 double-J left ureteral stent with strings attached. ESTIMATED BLOOD LOSS:  1 mL    FINDINGS:  A 4-mm proximal left ureter stone, basketed and removed. Normal left retrograde pyelogram.    INDICATIONS FOR OPERATIVE PROCEDURE:  The patient is a 79-year-old female with a history of a urosepsis secondary to a 5-mm obstructing proximal left ureter stone. She underwent emergent left stent placement by me back in 2019 and presents today for treatment of her stone now that her sepsis has resolved. We discussed the procedure in the office and a risks and benefits analysis was performed and she opted to proceed. DESCRIPTION OF OPERATIVE PROCEDURE:  After informed consent was obtained, the correct patient was identified in the preoperative holding area. She was taken back to the operating suite and placed on the table in the supine position. Time-out was performed confirming the correct patient and planned procedure. She received 2 grams of IV Ancef prior to smooth induction of general endotracheal anesthesia. She was moved into the dorsal lithotomy position, prepped and draped in the usual sterile fashion. I began the case by inserting a 22-Macedonian rigid cystoscope with a 30-degree lens in the urethra and advanced it into the patient's bladder. .  Pancystoscopy was performed which was unremarkable except for a left ureteral stent that was not encrusted and was extruding from the left ureteral orifice. I cannulated the orifice with a guidewire. This was passed under fluoroscopic guidance in the left renal pelvis. I then backloaded my scope off the guide wire and reinserted it and used a flexible stent grasper to grasp the end of the stent and bring it out through the urethral meatus. A cannulated the stent with a second guidewire and then backloaded the stent off of this guidewire leaving the two wires in place. I then switched to pressure bag and my semi-rigid ureteroscope. I inserted this under direct vision into the patient's ureter and passed it into the proximal ureter where I encountered the 5-mm stone. The ureter did appear widely patent enough at this point that I was able to basket the stone with a Tricep basket. The stone broke into two pieces using the basket and I removed each piece completely intact and sent it off for pathologic analysis. I then carefully inspected the ureter up and down with my ureteroscope, all the way up to the kidney and down to the bladder. There was no evidence of any ureteral injury. There was a little bit of swelling where the stone was in the proximal ureter and therefore I did decide to leave another stent. I backed my scope down to the ureteral orifice and injected 10 mL of Conray to perform a retrograde pyelogram which showed no filling defects or hydroureteronephrosis and no evidence of ureteral injury. I then removed my rigid ureteroscope leaving one of the wires in place and I pulled the other wire. I then loaded the wire onto my rigid cystoscope and passed a new 6 x 24 double-J left ureteral stent over the wire under fluoroscopic guidance into the left renal pelvis. Once the stent was in position, I pulled the wire noting a good curl in the left renal pelvis as well as the bladder. Strings were left extruding from the meatus and I drained the bladder completely.   The patient was then awoken from anesthesia, transferred to the PACU in stable condition. She tolerated the procedure well. There were no complications. All counts were correct at the end of the procedure.       Nataliya Earl MD      [id number]  D:  01/30/2020 10:40  T:  01/30/2020 18:23  JOB #:  [id number]

## (undated) DEVICE — HOOKED-PRONG GRASPING FORCEPS: Brand: TRICEP

## (undated) DEVICE — GARMENT,MEDLINE,DVT,INT,CALF,MED, GEN2: Brand: MEDLINE

## (undated) DEVICE — SOLUTION IRRIG 3000ML H2O STRL BAG

## (undated) DEVICE — GDWIRE 3CM FLX-TIP 0.038X150CM -- BX/5 SENSOR

## (undated) DEVICE — GUIDEWIRE ENDOSCP L150CM DIA0.038IN TIP L3CM PTFE FLX STR

## (undated) DEVICE — TRAY PREP DRY W/ PREM GLV 2 APPL 6 SPNG 2 UNDPD 1 OVERWRAP

## (undated) DEVICE — SOLUTION IRRIG 1000ML H2O STRL BLT

## (undated) DEVICE — CYSTO: Brand: MEDLINE INDUSTRIES, INC.

## (undated) DEVICE — CYSTO/BLADDER IRRIGATION SET, REGULATING CLAMP

## (undated) DEVICE — PACK SURGICAL PROCEDURE KIT CYSTOSCOPY TOTE

## (undated) DEVICE — CATHETER F BLLN 5CC 16FR 2 W HYDRGEL COAT LESS TRAUM LUB